# Patient Record
Sex: MALE | Race: BLACK OR AFRICAN AMERICAN | NOT HISPANIC OR LATINO | Employment: FULL TIME | URBAN - METROPOLITAN AREA
[De-identification: names, ages, dates, MRNs, and addresses within clinical notes are randomized per-mention and may not be internally consistent; named-entity substitution may affect disease eponyms.]

---

## 2017-04-16 ENCOUNTER — COMMUNICATION - HEALTHEAST (OUTPATIENT)
Dept: INTERNAL MEDICINE | Facility: CLINIC | Age: 57
End: 2017-04-16

## 2017-04-16 DIAGNOSIS — E11.9 TYPE 2 DIABETES MELLITUS (H): ICD-10-CM

## 2017-04-16 DIAGNOSIS — Z00.00 PREVENTATIVE HEALTH CARE: ICD-10-CM

## 2017-04-20 ENCOUNTER — OFFICE VISIT - HEALTHEAST (OUTPATIENT)
Dept: INTERNAL MEDICINE | Facility: CLINIC | Age: 57
End: 2017-04-20

## 2017-04-20 DIAGNOSIS — E11.9 TYPE 2 DIABETES MELLITUS (H): ICD-10-CM

## 2017-04-20 LAB
ALT SERPL W P-5'-P-CCNC: 19 U/L (ref 0–45)
LDLC SERPL CALC-MCNC: 166 MG/DL

## 2017-04-21 ENCOUNTER — COMMUNICATION - HEALTHEAST (OUTPATIENT)
Dept: INTERNAL MEDICINE | Facility: CLINIC | Age: 57
End: 2017-04-21

## 2017-04-21 DIAGNOSIS — E11.9 TYPE 2 DIABETES MELLITUS (H): ICD-10-CM

## 2017-04-24 ENCOUNTER — COMMUNICATION - HEALTHEAST (OUTPATIENT)
Dept: INTERNAL MEDICINE | Facility: CLINIC | Age: 57
End: 2017-04-24

## 2017-04-24 ENCOUNTER — AMBULATORY - HEALTHEAST (OUTPATIENT)
Dept: INTERNAL MEDICINE | Facility: CLINIC | Age: 57
End: 2017-04-24

## 2017-04-25 ENCOUNTER — COMMUNICATION - HEALTHEAST (OUTPATIENT)
Dept: INTERNAL MEDICINE | Facility: CLINIC | Age: 57
End: 2017-04-25

## 2017-04-25 DIAGNOSIS — E11.9 TYPE 2 DIABETES MELLITUS (H): ICD-10-CM

## 2017-05-05 ENCOUNTER — RECORDS - HEALTHEAST (OUTPATIENT)
Dept: ADMINISTRATIVE | Facility: OTHER | Age: 57
End: 2017-05-05

## 2017-06-23 ENCOUNTER — COMMUNICATION - HEALTHEAST (OUTPATIENT)
Dept: INTERNAL MEDICINE | Facility: CLINIC | Age: 57
End: 2017-06-23

## 2017-06-23 DIAGNOSIS — E11.9 TYPE 2 DIABETES MELLITUS (H): ICD-10-CM

## 2017-07-16 ENCOUNTER — COMMUNICATION - HEALTHEAST (OUTPATIENT)
Dept: INTERNAL MEDICINE | Facility: CLINIC | Age: 57
End: 2017-07-16

## 2017-07-16 DIAGNOSIS — Z00.00 PREVENTATIVE HEALTH CARE: ICD-10-CM

## 2017-12-11 ENCOUNTER — COMMUNICATION - HEALTHEAST (OUTPATIENT)
Dept: INTERNAL MEDICINE | Facility: CLINIC | Age: 57
End: 2017-12-11

## 2017-12-11 DIAGNOSIS — E11.9 TYPE 2 DIABETES MELLITUS (H): ICD-10-CM

## 2017-12-19 ENCOUNTER — OFFICE VISIT - HEALTHEAST (OUTPATIENT)
Dept: INTERNAL MEDICINE | Facility: CLINIC | Age: 57
End: 2017-12-19

## 2017-12-19 DIAGNOSIS — E11.9 TYPE 2 DIABETES MELLITUS (H): ICD-10-CM

## 2017-12-19 LAB
HBA1C MFR BLD: 6.6 % (ref 3.5–6)
LDLC SERPL CALC-MCNC: 108 MG/DL

## 2017-12-19 ASSESSMENT — MIFFLIN-ST. JEOR: SCORE: 1792.41

## 2017-12-20 ENCOUNTER — COMMUNICATION - HEALTHEAST (OUTPATIENT)
Dept: INTERNAL MEDICINE | Facility: CLINIC | Age: 57
End: 2017-12-20

## 2018-01-28 ENCOUNTER — COMMUNICATION - HEALTHEAST (OUTPATIENT)
Dept: INTERNAL MEDICINE | Facility: CLINIC | Age: 58
End: 2018-01-28

## 2018-01-28 ENCOUNTER — AMBULATORY - HEALTHEAST (OUTPATIENT)
Dept: INTERNAL MEDICINE | Facility: CLINIC | Age: 58
End: 2018-01-28

## 2018-06-13 ENCOUNTER — OFFICE VISIT - HEALTHEAST (OUTPATIENT)
Dept: INTERNAL MEDICINE | Facility: CLINIC | Age: 58
End: 2018-06-13

## 2018-06-13 ENCOUNTER — COMMUNICATION - HEALTHEAST (OUTPATIENT)
Dept: INTERNAL MEDICINE | Facility: CLINIC | Age: 58
End: 2018-06-13

## 2018-06-13 DIAGNOSIS — E11.9 TYPE 2 DIABETES MELLITUS (H): ICD-10-CM

## 2018-06-13 DIAGNOSIS — E11.9 TYPE 2 DIABETES MELLITUS WITHOUT COMPLICATION, WITHOUT LONG-TERM CURRENT USE OF INSULIN (H): ICD-10-CM

## 2018-06-13 DIAGNOSIS — Z00.00 PREVENTATIVE HEALTH CARE: ICD-10-CM

## 2018-06-13 DIAGNOSIS — Z00.00 HEALTH MAINTENANCE EXAMINATION: ICD-10-CM

## 2018-06-13 DIAGNOSIS — Z12.5 PROSTATE CANCER SCREENING: ICD-10-CM

## 2018-06-13 LAB
ALBUMIN SERPL-MCNC: 3.9 G/DL (ref 3.5–5)
ALP SERPL-CCNC: 47 U/L (ref 45–120)
ALT SERPL W P-5'-P-CCNC: 22 U/L (ref 0–45)
ANION GAP SERPL CALCULATED.3IONS-SCNC: 16 MMOL/L (ref 5–18)
AST SERPL W P-5'-P-CCNC: 26 U/L (ref 0–40)
BILIRUB DIRECT SERPL-MCNC: 0.1 MG/DL
BILIRUB SERPL-MCNC: 0.5 MG/DL (ref 0–1)
BUN SERPL-MCNC: 5 MG/DL (ref 8–22)
CALCIUM SERPL-MCNC: 9.7 MG/DL (ref 8.5–10.5)
CHLORIDE BLD-SCNC: 102 MMOL/L (ref 98–107)
CHOLEST SERPL-MCNC: 145 MG/DL
CO2 SERPL-SCNC: 22 MMOL/L (ref 22–31)
CREAT SERPL-MCNC: 0.73 MG/DL (ref 0.7–1.3)
CREAT UR-MCNC: 86.3 MG/DL
ERYTHROCYTE [DISTWIDTH] IN BLOOD BY AUTOMATED COUNT: 11.6 % (ref 11–14.5)
FASTING STATUS PATIENT QL REPORTED: NO
GFR SERPL CREATININE-BSD FRML MDRD: >60 ML/MIN/1.73M2
GLUCOSE BLD-MCNC: 123 MG/DL (ref 70–125)
HBA1C MFR BLD: 6.3 % (ref 3.5–6)
HCT VFR BLD AUTO: 43.6 % (ref 40–54)
HDLC SERPL-MCNC: 47 MG/DL
HGB BLD-MCNC: 14.6 G/DL (ref 14–18)
LDLC SERPL CALC-MCNC: 57 MG/DL
MCH RBC QN AUTO: 28.4 PG (ref 27–34)
MCHC RBC AUTO-ENTMCNC: 33.5 G/DL (ref 32–36)
MCV RBC AUTO: 85 FL (ref 80–100)
MICROALBUMIN UR-MCNC: <0.5 MG/DL (ref 0–1.99)
MICROALBUMIN/CREAT UR: NORMAL MG/G
PLATELET # BLD AUTO: 156 THOU/UL (ref 140–440)
PMV BLD AUTO: 8.7 FL (ref 7–10)
POTASSIUM BLD-SCNC: 4.2 MMOL/L (ref 3.5–5)
PROT SERPL-MCNC: 6.7 G/DL (ref 6–8)
PSA SERPL-MCNC: 1.1 NG/ML (ref 0–3.5)
RBC # BLD AUTO: 5.15 MILL/UL (ref 4.4–6.2)
SODIUM SERPL-SCNC: 140 MMOL/L (ref 136–145)
TRIGL SERPL-MCNC: 206 MG/DL
WBC: 7.2 THOU/UL (ref 4–11)

## 2018-06-13 ASSESSMENT — MIFFLIN-ST. JEOR: SCORE: 1819.63

## 2018-06-18 ENCOUNTER — COMMUNICATION - HEALTHEAST (OUTPATIENT)
Dept: INTERNAL MEDICINE | Facility: CLINIC | Age: 58
End: 2018-06-18

## 2018-06-18 DIAGNOSIS — E11.9 DIABETES MELLITUS, TYPE 2 (H): ICD-10-CM

## 2018-06-19 ENCOUNTER — COMMUNICATION - HEALTHEAST (OUTPATIENT)
Dept: INTERNAL MEDICINE | Facility: CLINIC | Age: 58
End: 2018-06-19

## 2018-12-14 ENCOUNTER — OFFICE VISIT - HEALTHEAST (OUTPATIENT)
Dept: INTERNAL MEDICINE | Facility: CLINIC | Age: 58
End: 2018-12-14

## 2018-12-14 ENCOUNTER — COMMUNICATION - HEALTHEAST (OUTPATIENT)
Dept: INTERNAL MEDICINE | Facility: CLINIC | Age: 58
End: 2018-12-14

## 2018-12-14 DIAGNOSIS — E11.9 TYPE 2 DIABETES MELLITUS (H): ICD-10-CM

## 2018-12-14 LAB
HBA1C MFR BLD: 6.5 % (ref 3.5–6)
LDLC SERPL CALC-MCNC: 73 MG/DL

## 2018-12-14 ASSESSMENT — MIFFLIN-ST. JEOR: SCORE: 1783.79

## 2018-12-15 ENCOUNTER — COMMUNICATION - HEALTHEAST (OUTPATIENT)
Dept: INTERNAL MEDICINE | Facility: CLINIC | Age: 58
End: 2018-12-15

## 2018-12-17 ENCOUNTER — COMMUNICATION - HEALTHEAST (OUTPATIENT)
Dept: INTERNAL MEDICINE | Facility: CLINIC | Age: 58
End: 2018-12-17

## 2018-12-17 DIAGNOSIS — E11.9 TYPE 2 DIABETES MELLITUS (H): ICD-10-CM

## 2019-01-16 ENCOUNTER — OFFICE VISIT - HEALTHEAST (OUTPATIENT)
Dept: INTERNAL MEDICINE | Facility: CLINIC | Age: 59
End: 2019-01-16

## 2019-01-16 DIAGNOSIS — M54.50 ACUTE MIDLINE LOW BACK PAIN WITHOUT SCIATICA: ICD-10-CM

## 2019-01-16 DIAGNOSIS — M54.2 CERVICALGIA: ICD-10-CM

## 2019-01-16 DIAGNOSIS — S06.0X9D CONCUSSION WITH LOSS OF CONSCIOUSNESS, SUBSEQUENT ENCOUNTER: ICD-10-CM

## 2019-01-16 ASSESSMENT — MIFFLIN-ST. JEOR: SCORE: 1788.78

## 2019-01-17 ENCOUNTER — COMMUNICATION - HEALTHEAST (OUTPATIENT)
Dept: INTERNAL MEDICINE | Facility: CLINIC | Age: 59
End: 2019-01-17

## 2019-01-17 ENCOUNTER — HOSPITAL ENCOUNTER (OUTPATIENT)
Dept: CT IMAGING | Facility: CLINIC | Age: 59
Discharge: HOME | End: 2019-01-17
Attending: INTERNAL MEDICINE

## 2019-01-17 DIAGNOSIS — S06.0X9D CONCUSSION WITH LOSS OF CONSCIOUSNESS, SUBSEQUENT ENCOUNTER: ICD-10-CM

## 2019-02-08 ENCOUNTER — COMMUNICATION - HEALTHEAST (OUTPATIENT)
Dept: INTERNAL MEDICINE | Facility: CLINIC | Age: 59
End: 2019-02-08

## 2019-05-28 ENCOUNTER — COMMUNICATION - HEALTHEAST (OUTPATIENT)
Dept: INTERNAL MEDICINE | Facility: CLINIC | Age: 59
End: 2019-05-28

## 2019-06-17 ENCOUNTER — COMMUNICATION - HEALTHEAST (OUTPATIENT)
Dept: INTERNAL MEDICINE | Facility: CLINIC | Age: 59
End: 2019-06-17

## 2019-06-17 DIAGNOSIS — Z12.5 PROSTATE CANCER SCREENING: ICD-10-CM

## 2019-06-17 DIAGNOSIS — E11.9 TYPE 2 DIABETES MELLITUS (H): ICD-10-CM

## 2019-06-24 ENCOUNTER — OFFICE VISIT - HEALTHEAST (OUTPATIENT)
Dept: INTERNAL MEDICINE | Facility: CLINIC | Age: 59
End: 2019-06-24

## 2019-06-24 DIAGNOSIS — Z12.11 SCREEN FOR COLON CANCER: ICD-10-CM

## 2019-06-24 DIAGNOSIS — E11.9 TYPE 2 DIABETES MELLITUS (H): ICD-10-CM

## 2019-06-24 DIAGNOSIS — Z12.5 PROSTATE CANCER SCREENING: ICD-10-CM

## 2019-06-24 LAB
ALBUMIN SERPL-MCNC: 3.9 G/DL (ref 3.5–5)
ALP SERPL-CCNC: 44 U/L (ref 45–120)
ALT SERPL W P-5'-P-CCNC: 22 U/L (ref 0–45)
ANION GAP SERPL CALCULATED.3IONS-SCNC: 12 MMOL/L (ref 5–18)
AST SERPL W P-5'-P-CCNC: 23 U/L (ref 0–40)
BILIRUB SERPL-MCNC: 0.4 MG/DL (ref 0–1)
BUN SERPL-MCNC: 6 MG/DL (ref 8–22)
CALCIUM SERPL-MCNC: 9.8 MG/DL (ref 8.5–10.5)
CHLORIDE BLD-SCNC: 103 MMOL/L (ref 98–107)
CHOLEST SERPL-MCNC: 172 MG/DL
CO2 SERPL-SCNC: 23 MMOL/L (ref 22–31)
CREAT SERPL-MCNC: 0.78 MG/DL (ref 0.7–1.3)
CREAT UR-MCNC: 39.2 MG/DL
FASTING STATUS PATIENT QL REPORTED: ABNORMAL
GFR SERPL CREATININE-BSD FRML MDRD: >60 ML/MIN/1.73M2
GLUCOSE BLD-MCNC: 99 MG/DL (ref 70–125)
HBA1C MFR BLD: 6.2 % (ref 3.5–6)
HDLC SERPL-MCNC: 48 MG/DL
LDLC SERPL CALC-MCNC: 88 MG/DL
MICROALBUMIN UR-MCNC: <0.5 MG/DL (ref 0–1.99)
MICROALBUMIN/CREAT UR: NORMAL MG/G
POTASSIUM BLD-SCNC: 4.3 MMOL/L (ref 3.5–5)
PROT SERPL-MCNC: 6.7 G/DL (ref 6–8)
PSA SERPL-MCNC: 0.9 NG/ML (ref 0–3.5)
SODIUM SERPL-SCNC: 138 MMOL/L (ref 136–145)
TRIGL SERPL-MCNC: 180 MG/DL

## 2019-06-24 ASSESSMENT — MIFFLIN-ST. JEOR: SCORE: 1807.38

## 2019-06-25 ENCOUNTER — COMMUNICATION - HEALTHEAST (OUTPATIENT)
Dept: INTERNAL MEDICINE | Facility: CLINIC | Age: 59
End: 2019-06-25

## 2019-07-23 ENCOUNTER — RECORDS - HEALTHEAST (OUTPATIENT)
Dept: ADMINISTRATIVE | Facility: OTHER | Age: 59
End: 2019-07-23

## 2019-07-23 LAB — RETINOPATHY: NEGATIVE

## 2019-07-30 ENCOUNTER — RECORDS - HEALTHEAST (OUTPATIENT)
Dept: HEALTH INFORMATION MANAGEMENT | Facility: CLINIC | Age: 59
End: 2019-07-30

## 2019-10-15 ENCOUNTER — COMMUNICATION - HEALTHEAST (OUTPATIENT)
Dept: INTERNAL MEDICINE | Facility: CLINIC | Age: 59
End: 2019-10-15

## 2019-10-15 ENCOUNTER — OFFICE VISIT - HEALTHEAST (OUTPATIENT)
Dept: INTERNAL MEDICINE | Facility: CLINIC | Age: 59
End: 2019-10-15

## 2019-10-15 DIAGNOSIS — E11.9 TYPE 2 DIABETES MELLITUS (H): ICD-10-CM

## 2019-10-15 LAB — HBA1C MFR BLD: 6.3 % (ref 3.5–6)

## 2020-08-19 ENCOUNTER — COMMUNICATION - HEALTHEAST (OUTPATIENT)
Dept: INTERNAL MEDICINE | Facility: CLINIC | Age: 60
End: 2020-08-19

## 2020-08-19 ENCOUNTER — OFFICE VISIT - HEALTHEAST (OUTPATIENT)
Dept: INTERNAL MEDICINE | Facility: CLINIC | Age: 60
End: 2020-08-19

## 2020-08-19 ENCOUNTER — COMMUNICATION - HEALTHEAST (OUTPATIENT)
Dept: NURSING | Facility: CLINIC | Age: 60
End: 2020-08-19

## 2020-08-19 DIAGNOSIS — E11.9 TYPE 2 DIABETES MELLITUS (H): ICD-10-CM

## 2020-08-19 DIAGNOSIS — E78.00 HYPERCHOLESTEROLEMIA: ICD-10-CM

## 2020-08-19 DIAGNOSIS — E11.9 TYPE 2 DIABETES MELLITUS WITHOUT COMPLICATION, WITHOUT LONG-TERM CURRENT USE OF INSULIN (H): ICD-10-CM

## 2020-08-19 LAB
ALBUMIN SERPL-MCNC: 3.9 G/DL (ref 3.5–5)
ALP SERPL-CCNC: 41 U/L (ref 45–120)
ALT SERPL W P-5'-P-CCNC: 13 U/L (ref 0–45)
ANION GAP SERPL CALCULATED.3IONS-SCNC: 12 MMOL/L (ref 5–18)
AST SERPL W P-5'-P-CCNC: 20 U/L (ref 0–40)
BILIRUB SERPL-MCNC: 0.4 MG/DL (ref 0–1)
BUN SERPL-MCNC: 3 MG/DL (ref 8–22)
CALCIUM SERPL-MCNC: 9 MG/DL (ref 8.5–10.5)
CHLORIDE BLD-SCNC: 101 MMOL/L (ref 98–107)
CHOLEST SERPL-MCNC: 168 MG/DL
CO2 SERPL-SCNC: 22 MMOL/L (ref 22–31)
CREAT SERPL-MCNC: 0.76 MG/DL (ref 0.7–1.3)
CREAT UR-MCNC: 51.1 MG/DL
FASTING STATUS PATIENT QL REPORTED: YES
GFR SERPL CREATININE-BSD FRML MDRD: >60 ML/MIN/1.73M2
GLUCOSE BLD-MCNC: 106 MG/DL (ref 70–125)
HBA1C MFR BLD: 6.1 %
HDLC SERPL-MCNC: 46 MG/DL
LDLC SERPL CALC-MCNC: 106 MG/DL
MICROALBUMIN UR-MCNC: <0.5 MG/DL (ref 0–1.99)
MICROALBUMIN/CREAT UR: NORMAL MG/G{CREAT}
POTASSIUM BLD-SCNC: 4.1 MMOL/L (ref 3.5–5)
PROT SERPL-MCNC: 7.2 G/DL (ref 6–8)
SODIUM SERPL-SCNC: 135 MMOL/L (ref 136–145)
TRIGL SERPL-MCNC: 80 MG/DL

## 2020-08-19 ASSESSMENT — MIFFLIN-ST. JEOR: SCORE: 1760.66

## 2020-08-20 ENCOUNTER — COMMUNICATION - HEALTHEAST (OUTPATIENT)
Dept: INTERNAL MEDICINE | Facility: CLINIC | Age: 60
End: 2020-08-20

## 2020-09-27 ENCOUNTER — COMMUNICATION - HEALTHEAST (OUTPATIENT)
Dept: INTERNAL MEDICINE | Facility: CLINIC | Age: 60
End: 2020-09-27

## 2020-09-28 ENCOUNTER — COMMUNICATION - HEALTHEAST (OUTPATIENT)
Dept: INTERNAL MEDICINE | Facility: CLINIC | Age: 60
End: 2020-09-28

## 2020-09-28 ENCOUNTER — OFFICE VISIT - HEALTHEAST (OUTPATIENT)
Dept: INTERNAL MEDICINE | Facility: CLINIC | Age: 60
End: 2020-09-28

## 2020-09-28 DIAGNOSIS — E11.9 TYPE 2 DIABETES MELLITUS WITHOUT COMPLICATION, WITHOUT LONG-TERM CURRENT USE OF INSULIN (H): ICD-10-CM

## 2020-09-28 DIAGNOSIS — I10 ESSENTIAL HYPERTENSION: ICD-10-CM

## 2020-09-28 DIAGNOSIS — E11.9 TYPE 2 DIABETES MELLITUS (H): ICD-10-CM

## 2020-09-28 ASSESSMENT — MIFFLIN-ST. JEOR: SCORE: 1753.86

## 2020-10-01 RX ORDER — SIMVASTATIN 20 MG
TABLET ORAL
Qty: 90 TABLET | Refills: 3 | Status: SHIPPED | OUTPATIENT
Start: 2020-10-01 | End: 2021-10-14

## 2020-10-08 ENCOUNTER — COMMUNICATION - HEALTHEAST (OUTPATIENT)
Dept: INTERNAL MEDICINE | Facility: CLINIC | Age: 60
End: 2020-10-08

## 2020-11-14 ENCOUNTER — COMMUNICATION - HEALTHEAST (OUTPATIENT)
Dept: INTERNAL MEDICINE | Facility: CLINIC | Age: 60
End: 2020-11-14

## 2020-11-14 DIAGNOSIS — E11.9 TYPE 2 DIABETES MELLITUS (H): ICD-10-CM

## 2020-12-19 ENCOUNTER — COMMUNICATION - HEALTHEAST (OUTPATIENT)
Dept: INTERNAL MEDICINE | Facility: CLINIC | Age: 60
End: 2020-12-19

## 2020-12-19 DIAGNOSIS — E11.9 TYPE 2 DIABETES MELLITUS (H): ICD-10-CM

## 2021-01-13 ENCOUNTER — COMMUNICATION - HEALTHEAST (OUTPATIENT)
Dept: INTERNAL MEDICINE | Facility: CLINIC | Age: 61
End: 2021-01-13

## 2021-01-13 DIAGNOSIS — I10 ESSENTIAL HYPERTENSION: ICD-10-CM

## 2021-01-21 ENCOUNTER — OFFICE VISIT - HEALTHEAST (OUTPATIENT)
Dept: INTERNAL MEDICINE | Facility: CLINIC | Age: 61
End: 2021-01-21

## 2021-01-21 DIAGNOSIS — Z12.11 COLON CANCER SCREENING: ICD-10-CM

## 2021-01-21 DIAGNOSIS — E78.00 HYPERCHOLESTEROLEMIA: ICD-10-CM

## 2021-01-21 DIAGNOSIS — E11.9 TYPE 2 DIABETES MELLITUS WITHOUT COMPLICATION, WITHOUT LONG-TERM CURRENT USE OF INSULIN (H): ICD-10-CM

## 2021-01-21 DIAGNOSIS — E55.9 VITAMIN D DEFICIENCY: ICD-10-CM

## 2021-01-21 DIAGNOSIS — Z11.59 ENCOUNTER FOR HEPATITIS C SCREENING TEST FOR LOW RISK PATIENT: ICD-10-CM

## 2021-01-21 DIAGNOSIS — I10 ESSENTIAL HYPERTENSION: ICD-10-CM

## 2021-01-21 DIAGNOSIS — Z12.5 SCREENING FOR PROSTATE CANCER: ICD-10-CM

## 2021-01-21 DIAGNOSIS — Z23 NEED FOR VACCINATION: ICD-10-CM

## 2021-01-21 DIAGNOSIS — Z00.00 ANNUAL PHYSICAL EXAM: ICD-10-CM

## 2021-01-21 LAB
ALBUMIN SERPL-MCNC: 4.2 G/DL (ref 3.5–5)
ALP SERPL-CCNC: 45 U/L (ref 45–120)
ALT SERPL W P-5'-P-CCNC: 18 U/L (ref 0–45)
ANION GAP SERPL CALCULATED.3IONS-SCNC: 12 MMOL/L (ref 5–18)
AST SERPL W P-5'-P-CCNC: 23 U/L (ref 0–40)
BASOPHILS # BLD AUTO: 0.1 THOU/UL (ref 0–0.2)
BASOPHILS NFR BLD AUTO: 1 % (ref 0–2)
BILIRUB SERPL-MCNC: 0.5 MG/DL (ref 0–1)
BUN SERPL-MCNC: 5 MG/DL (ref 8–22)
CALCIUM SERPL-MCNC: 9.2 MG/DL (ref 8.5–10.5)
CHLORIDE BLD-SCNC: 102 MMOL/L (ref 98–107)
CHOLEST SERPL-MCNC: 139 MG/DL
CO2 SERPL-SCNC: 23 MMOL/L (ref 22–31)
CREAT SERPL-MCNC: 0.83 MG/DL (ref 0.7–1.3)
CREAT UR-MCNC: 75.7 MG/DL
EOSINOPHIL # BLD AUTO: 0.2 THOU/UL (ref 0–0.4)
EOSINOPHIL NFR BLD AUTO: 2 % (ref 0–6)
ERYTHROCYTE [DISTWIDTH] IN BLOOD BY AUTOMATED COUNT: 11 % (ref 11–14.5)
FASTING STATUS PATIENT QL REPORTED: NORMAL
GFR SERPL CREATININE-BSD FRML MDRD: >60 ML/MIN/1.73M2
GLUCOSE BLD-MCNC: 106 MG/DL (ref 70–125)
HBA1C MFR BLD: 6.5 %
HCT VFR BLD AUTO: 43.6 % (ref 40–54)
HDLC SERPL-MCNC: 49 MG/DL
HGB BLD-MCNC: 14.9 G/DL (ref 14–18)
LDLC SERPL CALC-MCNC: 75 MG/DL
LYMPHOCYTES # BLD AUTO: 2.8 THOU/UL (ref 0.8–4.4)
LYMPHOCYTES NFR BLD AUTO: 34 % (ref 20–40)
MCH RBC QN AUTO: 28.4 PG (ref 27–34)
MCHC RBC AUTO-ENTMCNC: 34.1 G/DL (ref 32–36)
MCV RBC AUTO: 83 FL (ref 80–100)
MICROALBUMIN UR-MCNC: <0.5 MG/DL (ref 0–1.99)
MICROALBUMIN/CREAT UR: NORMAL MG/G{CREAT}
MONOCYTES # BLD AUTO: 0.4 THOU/UL (ref 0–0.9)
MONOCYTES NFR BLD AUTO: 5 % (ref 2–10)
NEUTROPHILS # BLD AUTO: 4.9 THOU/UL (ref 2–7.7)
NEUTROPHILS NFR BLD AUTO: 58 % (ref 50–70)
PLATELET # BLD AUTO: 164 THOU/UL (ref 140–440)
PMV BLD AUTO: 8.6 FL (ref 7–10)
POTASSIUM BLD-SCNC: 3.9 MMOL/L (ref 3.5–5)
PROT SERPL-MCNC: 7.1 G/DL (ref 6–8)
PSA SERPL-MCNC: 1.1 NG/ML (ref 0–3.5)
RBC # BLD AUTO: 5.23 MILL/UL (ref 4.4–6.2)
SODIUM SERPL-SCNC: 137 MMOL/L (ref 136–145)
TRIGL SERPL-MCNC: 77 MG/DL
VIT B12 SERPL-MCNC: 170 PG/ML (ref 213–816)
WBC: 8.3 THOU/UL (ref 4–11)

## 2021-01-21 ASSESSMENT — MIFFLIN-ST. JEOR: SCORE: 1776.54

## 2021-01-22 LAB
25(OH)D3 SERPL-MCNC: 10 NG/ML (ref 30–80)
HCV AB SERPL QL IA: NEGATIVE

## 2021-01-24 ENCOUNTER — COMMUNICATION - HEALTHEAST (OUTPATIENT)
Dept: INTERNAL MEDICINE | Facility: CLINIC | Age: 61
End: 2021-01-24

## 2021-01-24 DIAGNOSIS — E53.8 VITAMIN B12 DEFICIENCY (NON ANEMIC): ICD-10-CM

## 2021-01-24 DIAGNOSIS — E55.9 VITAMIN D DEFICIENCY: ICD-10-CM

## 2021-01-25 ENCOUNTER — COMMUNICATION - HEALTHEAST (OUTPATIENT)
Dept: INTERNAL MEDICINE | Facility: CLINIC | Age: 61
End: 2021-01-25

## 2021-01-25 DIAGNOSIS — E55.9 VITAMIN D DEFICIENCY: ICD-10-CM

## 2021-01-25 DIAGNOSIS — E53.8 VITAMIN B12 DEFICIENCY (NON ANEMIC): ICD-10-CM

## 2021-04-21 ENCOUNTER — OFFICE VISIT - HEALTHEAST (OUTPATIENT)
Dept: INTERNAL MEDICINE | Facility: CLINIC | Age: 61
End: 2021-04-21

## 2021-04-21 ENCOUNTER — COMMUNICATION - HEALTHEAST (OUTPATIENT)
Dept: INTERNAL MEDICINE | Facility: CLINIC | Age: 61
End: 2021-04-21

## 2021-04-21 DIAGNOSIS — E53.8 VITAMIN B12 DEFICIENCY (NON ANEMIC): ICD-10-CM

## 2021-04-21 DIAGNOSIS — E55.9 VITAMIN D DEFICIENCY: ICD-10-CM

## 2021-04-21 DIAGNOSIS — I10 ESSENTIAL HYPERTENSION: ICD-10-CM

## 2021-04-21 DIAGNOSIS — E11.9 TYPE 2 DIABETES MELLITUS WITHOUT COMPLICATION, WITHOUT LONG-TERM CURRENT USE OF INSULIN (H): ICD-10-CM

## 2021-04-21 LAB
FOLATE SERPL-MCNC: 11.9 NG/ML
VIT B12 SERPL-MCNC: 351 PG/ML (ref 213–816)

## 2021-04-21 ASSESSMENT — MIFFLIN-ST. JEOR: SCORE: 1794.68

## 2021-04-23 ENCOUNTER — AMBULATORY - HEALTHEAST (OUTPATIENT)
Dept: NURSING | Facility: CLINIC | Age: 61
End: 2021-04-23

## 2021-04-27 ENCOUNTER — COMMUNICATION - HEALTHEAST (OUTPATIENT)
Dept: INTERNAL MEDICINE | Facility: CLINIC | Age: 61
End: 2021-04-27

## 2021-04-27 DIAGNOSIS — E53.8 VITAMIN B12 DEFICIENCY (NON ANEMIC): ICD-10-CM

## 2021-04-27 DIAGNOSIS — E11.9 TYPE 2 DIABETES MELLITUS WITHOUT COMPLICATION, WITHOUT LONG-TERM CURRENT USE OF INSULIN (H): ICD-10-CM

## 2021-04-27 DIAGNOSIS — E11.9 TYPE 2 DIABETES MELLITUS (H): ICD-10-CM

## 2021-04-27 DIAGNOSIS — E55.9 VITAMIN D DEFICIENCY: ICD-10-CM

## 2021-04-27 DIAGNOSIS — I10 ESSENTIAL HYPERTENSION: ICD-10-CM

## 2021-04-28 ENCOUNTER — COMMUNICATION - HEALTHEAST (OUTPATIENT)
Dept: INTERNAL MEDICINE | Facility: CLINIC | Age: 61
End: 2021-04-28

## 2021-04-28 RX ORDER — LOSARTAN POTASSIUM 50 MG/1
50 TABLET ORAL DAILY
Qty: 90 TABLET | Refills: 3 | Status: SHIPPED | OUTPATIENT
Start: 2021-04-28 | End: 2022-04-28

## 2021-05-05 ENCOUNTER — COMMUNICATION - HEALTHEAST (OUTPATIENT)
Dept: INTERNAL MEDICINE | Facility: CLINIC | Age: 61
End: 2021-05-05

## 2021-05-05 DIAGNOSIS — E11.9 TYPE 2 DIABETES MELLITUS (H): ICD-10-CM

## 2021-05-11 ENCOUNTER — AMBULATORY - HEALTHEAST (OUTPATIENT)
Dept: NURSING | Facility: CLINIC | Age: 61
End: 2021-05-11

## 2021-05-29 NOTE — TELEPHONE ENCOUNTER
Spoke with pt about diabetic eye quality.  Pt reports he didn't have health insurance last year, so he didn't go to the eye doctor.  Pt reports he has health insurance now, so he will make an appt to see the ophthalmologist next month.  Pt declined referral.

## 2021-05-29 NOTE — PROGRESS NOTES
[facility] Clinic Note    Tano Garcia   58 y.o. male    Date of Visit: 6/24/2019  Chief Complaint   Patient presents with     Diabetes       ASSESSMENT/PLAN  1. Type 2 diabetes mellitus (H)  Microalbumin, Random Urine    Glycosylated Hemoglobin A1c    Lipid Cascade    Comprehensive Metabolic Panel    Ambulatory referral to Ophthalmology   2. Screen for colon cancer  Ambulatory referral for Colonoscopy   3. Prostate cancer screening  PSA (Prostatic-Specific Antigen), Annual Screen     ---------------------------------------------    1.  Well-controlled, A1c at goal    2.  Referred for colonoscopy    3.  Update PSA.    Return in about 6 months (around 12/24/2019) for Recheck, Diabetes.      SUBJECTIVE    Tano Garcia is here for diabetes check.  He had an A1c pre-draw, but 40 minutes after the A1c is not yet available.  He agreed to receive results via mail.    He has not had any concerns with the diabetes per se.    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  He otherwise passed all the tests up to that point.  He would like to try again next year.    He is still doing skilled nursing work at present.      ROS:   Per HPI, all other systems negative     Medications, allergies, and problem list were reviewed and updated    Patient Active Problem List   Diagnosis     Nephrolithiasis     Osteoarthritis     Type 2 diabetes mellitus (H)     Hyperlipidemia     Prostate cancer screening     No past medical history on file.  Current Outpatient Medications   Medication Sig Dispense Refill     aspirin 81 MG EC tablet Take 1 tablet (81 mg total) by mouth daily. 90 tablet 3     blood glucose meter (GLUCOMETER) Use 1 each As Directed as needed. Dispense glucometer brand per patient's insurance at pharmacy discretion. (E11.9) 1 each 0     generic lancets Use 1 each As Directed 2 (two) times a  day. Dispense brand per patient's insurance at pharmacy discretion. (E11.9) 200 each 1     loratadine (CLARITIN) 10 mg tablet Take 1 tablet (10 mg total) by mouth daily. 30 tablet 3     metFORMIN (GLUCOPHAGE) 1000 MG tablet TAKE 1 TAB BY MOUTH TWICE DAILY. 180 tablet 3     ONETOUCH DELICA LANCETS 30 gauge Misc USE ONCE DAILY 100 each 3     ONETOUCH ULTRA BLUE TEST STRIP strips USE 1 STRIP AS DIRECTED 2 TIMES A  strip 3     simvastatin (ZOCOR) 20 MG tablet Take 1 tablet (20 mg total) by mouth at bedtime. 90 tablet 3     No current facility-administered medications for this visit.      No Known Allergies    EXAM  Vitals:    06/24/19 1608   BP: 108/62   Patient Site: Left Arm   Patient Position: Sitting   Cuff Size: Adult Regular   Pulse: (!) 55   SpO2: 98%   Weight: 209 lb 4.8 oz (94.9 kg)   Height: 6' (1.829 m)         Skin: No ulcers on feet  Cardiovascular: 2+ pedal pulses bilaterally  Neuro: 6/6 on R and 5/6 on L for monofilament     Results reviewed:       Recent Results (from the past 240 hour(s))   Microalbumin, Random Urine   Result Value Ref Range    Microalbumin, Random Urine <0.50 0.00 - 1.99 mg/dL    Creatinine, Urine 39.2 mg/dL    Microalbumin/Creatinine Ratio Random Urine  <=19.9 mg/g   Glycosylated Hemoglobin A1c   Result Value Ref Range    Hemoglobin A1c 6.2 (H) 3.5 - 6.0 %   Lipid Cascade   Result Value Ref Range    Cholesterol 172 <=199 mg/dL    Triglycerides 180 (H) <=149 mg/dL    HDL Cholesterol 48 >=40 mg/dL    LDL Calculated 88 <=129 mg/dL    Patient Fasting > 8hrs? Unknown    Comprehensive Metabolic Panel   Result Value Ref Range    Sodium 138 136 - 145 mmol/L    Potassium 4.3 3.5 - 5.0 mmol/L    Chloride 103 98 - 107 mmol/L    CO2 23 22 - 31 mmol/L    Anion Gap, Calculation 12 5 - 18 mmol/L    Glucose 99 70 - 125 mg/dL    BUN 6 (L) 8 - 22 mg/dL    Creatinine 0.78 0.70 - 1.30 mg/dL    GFR MDRD Af Amer >60 >60 mL/min/1.73m2    GFR MDRD Non Af Amer >60 >60 mL/min/1.73m2    Bilirubin, Total  0.4 0.0 - 1.0 mg/dL    Calcium 9.8 8.5 - 10.5 mg/dL    Protein, Total 6.7 6.0 - 8.0 g/dL    Albumin 3.9 3.5 - 5.0 g/dL    Alkaline Phosphatase 44 (L) 45 - 120 U/L    AST 23 0 - 40 U/L    ALT 22 0 - 45 U/L   PSA (Prostatic-Specific Antigen), Annual Screen   Result Value Ref Range    PSA 0.9 0.0 - 3.5 ng/mL      Carlos Pete DO  Internal Medicine  [facility]

## 2021-05-29 NOTE — TELEPHONE ENCOUNTER
Please call pt to schedule DM & Microalbumin follow up with PCP     Please schedule pt for lab only before appointment with PCP if okay with the pt

## 2021-05-30 VITALS — HEIGHT: 72 IN | BODY MASS INDEX: 29.16 KG/M2

## 2021-05-31 VITALS — HEIGHT: 72 IN | BODY MASS INDEX: 27.9 KG/M2 | WEIGHT: 206 LBS

## 2021-06-01 VITALS — HEIGHT: 72 IN | WEIGHT: 212 LBS | BODY MASS INDEX: 28.71 KG/M2

## 2021-06-02 VITALS — BODY MASS INDEX: 27.64 KG/M2 | HEIGHT: 72 IN | WEIGHT: 204.1 LBS

## 2021-06-02 VITALS — WEIGHT: 205.2 LBS | BODY MASS INDEX: 27.79 KG/M2 | HEIGHT: 72 IN

## 2021-06-02 NOTE — PROGRESS NOTES
[facility] Clinic Note    Tano Garcia   59 y.o. male    Date of Visit: 10/15/2019  Chief Complaint   Patient presents with     Diabetes     Diabetic check, no concerns        ASSESSMENT/PLAN  1. Type 2 diabetes mellitus (H)  Glycosylated Hemoglobin A1c    blood glucose test (ONETOUCH ULTRA BLUE TEST STRIP) strips    metFORMIN (GLUCOPHAGE) 1000 MG tablet    aspirin 81 MG EC tablet     ---------------------------------------------    Diabetes well controlled, refill medications, test strips, update A1c.    Return in about 6 months (around 4/15/2020) for Diabetes, Recheck.      AB Freedman is a 59-year-old man with history of type 2 diabetes which is well controlled, who presents for medication check.    I last saw him on June 24, at which time he had an A1c drawn which was 6.2%.  Urine microalbumin screen was negative, and cholesterol well controlled.  PSA was also drawn and was 0.9.    The BG are in the 120s.     He is dealing with some new stress, family coming from [place].  He is originally from [place].  He is now looking for a bigger apartment.    He won the lottery to come to the [place] over 10 years ago, when his kids were 3 and 6, now they are 13 and 16 years old, they have been trying to come to the [place], but the border for [place] has been closed a long time, only recently did a get to cross to [place].  Tano cannot visit them, because he cannot leave the country.  He is hoping that everything goes well and his children can come to [place].  He has kept a lot of their report cards, things have sent him, he talks with them about once a week.    He is doing prison work, looking elsewhere    ROS:   Per HPI, all other systems negative     Medications, allergies, and problem list were reviewed and updated    Patient Active Problem List   Diagnosis     Nephrolithiasis     Osteoarthritis     Type 2 diabetes mellitus (H)     Hyperlipidemia     Prostate cancer  screening     History reviewed. No pertinent past medical history.  Current Outpatient Medications   Medication Sig Dispense Refill     aspirin 81 MG EC tablet Take 1 tablet (81 mg total) by mouth daily. 90 tablet 3     blood glucose meter (GLUCOMETER) Use 1 each As Directed as needed. Dispense glucometer brand per patient's insurance at pharmacy discretion. (E11.9) 1 each 0     blood glucose test (ONETOUCH ULTRA BLUE TEST STRIP) strips Apply 1 each topically daily. Dispense brand per patient's insurance at pharmacy discretion. 100 strip 3     generic lancets Use 1 each As Directed 2 (two) times a day. Dispense brand per patient's insurance at pharmacy discretion. (E11.9) 200 each 1     loratadine (CLARITIN) 10 mg tablet Take 1 tablet (10 mg total) by mouth daily. 30 tablet 3     metFORMIN (GLUCOPHAGE) 1000 MG tablet TAKE 1 TAB BY MOUTH TWICE DAILY 180 tablet 3     ONETOUCH DELICA LANCETS 30 gauge Misc USE ONCE DAILY 100 each 3     simvastatin (ZOCOR) 20 MG tablet Take 1 tablet (20 mg total) by mouth at bedtime. 90 tablet 3     No current facility-administered medications for this visit.      No Known Allergies    EXAM  Vitals:    10/15/19 1538   BP: 136/72   Patient Site: Left Arm   Patient Position: Sitting   Cuff Size: Adult Regular   Pulse: 60   Resp: 16   Temp: 97.6  F (36.4  C)   Weight: 209 lb 14.4 oz (95.2 kg)         General: Alert, no distress  Heart: Regular rate rhythm, no murmurs  Lungs: Clear to auscultation bilaterally    Results reviewed: Check A1c today  Data points:  1    Carlos Pete DO  Internal Medicine  [facility]

## 2021-06-03 VITALS
HEART RATE: 60 BPM | BODY MASS INDEX: 28.47 KG/M2 | RESPIRATION RATE: 16 BRPM | DIASTOLIC BLOOD PRESSURE: 72 MMHG | SYSTOLIC BLOOD PRESSURE: 136 MMHG | WEIGHT: 209.9 LBS | TEMPERATURE: 97.6 F

## 2021-06-03 VITALS — WEIGHT: 209.3 LBS | HEIGHT: 72 IN | BODY MASS INDEX: 28.35 KG/M2

## 2021-06-04 VITALS
OXYGEN SATURATION: 97 % | HEART RATE: 55 BPM | BODY MASS INDEX: 28.14 KG/M2 | SYSTOLIC BLOOD PRESSURE: 142 MMHG | DIASTOLIC BLOOD PRESSURE: 70 MMHG | WEIGHT: 201 LBS | HEIGHT: 71 IN

## 2021-06-04 VITALS
BODY MASS INDEX: 26.95 KG/M2 | HEART RATE: 55 BPM | SYSTOLIC BLOOD PRESSURE: 138 MMHG | DIASTOLIC BLOOD PRESSURE: 76 MMHG | HEIGHT: 72 IN | WEIGHT: 199 LBS | OXYGEN SATURATION: 98 %

## 2021-06-05 VITALS
WEIGHT: 206 LBS | HEIGHT: 71 IN | TEMPERATURE: 97.8 F | SYSTOLIC BLOOD PRESSURE: 130 MMHG | OXYGEN SATURATION: 98 % | HEART RATE: 58 BPM | DIASTOLIC BLOOD PRESSURE: 68 MMHG | BODY MASS INDEX: 28.84 KG/M2

## 2021-06-05 VITALS
HEIGHT: 71 IN | SYSTOLIC BLOOD PRESSURE: 138 MMHG | BODY MASS INDEX: 29.4 KG/M2 | DIASTOLIC BLOOD PRESSURE: 66 MMHG | HEART RATE: 52 BPM | OXYGEN SATURATION: 100 % | WEIGHT: 210 LBS

## 2021-06-10 NOTE — PROGRESS NOTES
Clinic Care Coordination Contact  [facility]/Voicemail       Clinical Data: Care Coordinator Outreach  Outreach attempted x 2.  Left message on patient's voicemail with call back information and requested return call.  Plan: Care Coordinator will send care coordination introduction letter with care coordinator contact information and explanation of care coordination services via mail. Care Coordinator will do no further outreaches at this time.

## 2021-06-10 NOTE — PROGRESS NOTES
ASSESSMENT:  1. Type 2 diabetes mellitus  Tano Garcia is a 56-year-old man here for diabetes check.  A1c traditionally has been well managed with diet, metformin.  Plan to recheck A1c today, refill test strips.  He is on aspirin and statin.  Foot exam is normal today.  He is overdue for eye exam, and was referred.  Since he is not fasting, check direct LDL.  - Basic Metabolic Panel  - Microalbumin, Random Urine  - LDL Cholesterol, Direct (increase simvastatin 10 to 20 since , ensure adherence)  - ALT (SGPT)  - Ambulatory referral to Ophthalmology        PLAN:  Patient Instructions   Recommend yearly eye exam     Blood tests today    Medications and test strips refilled       Orders Placed This Encounter   Procedures     Basic Metabolic Panel     Microalbumin, Random Urine     LDL Cholesterol, Direct     ALT (SGPT)     Ambulatory referral to Ophthalmology     Referral Priority:   Routine     Referral Type:   Consultation     Referral Reason:   Evaluation and Treatment     Referral Location:   [facility]     Requested Specialty:   Ophthalmology     Number of Visits Requested:   1     Medications Discontinued During This Encounter   Medication Reason     metFORMIN (GLUCOPHAGE) 1000 MG tablet Duplicate order     metFORMIN (GLUCOPHAGE) 1000 MG tablet      tadalafil (CIALIS) 10 MG tablet      simvastatin (ZOCOR) 10 MG tablet Reorder       Return in about 6 months (around 10/20/2017).    CHIEF COMPLAINT:  Chief Complaint   Patient presents with     Medication Management       HISTORY OF PRESENT ILLNESS:  Tano is a 56 y.o. male presenting to the clinic today for medication review.     Diabetes: He continues on metformin 1000mg twice daily. His most recent A1c was 7.1 on 10/20/16. He is due for diabetic eye exam.     Hyperlipidemia: He continues on simvastatin 10mg daily in addition to a low-dose aspirin.     REVIEW OF SYSTEMS:   He does not get regular exercise but is active at his job.  All other systems are negative.    PFSH:  He is currently working as a     TOBACCO USE:  History   Smoking Status     Former Smoker   Smokeless Tobacco     Not on file       VITALS:  Vitals:    04/20/17 1412   BP: 132/80   Pulse: (!) 56   Resp: 12   Height: 6' (1.829 m)     Wt Readings from Last 3 Encounters:   10/20/16 215 lb (97.5 kg)   05/18/16 208 lb (94.3 kg)   02/03/16 212 lb (96.2 kg)     There is no height or weight on file to calculate BMI.    PHYSICAL EXAM:  General: Alert and talkative. In no distress.   Heart: Regular rate and rhythm. No murmur.   Lungs: Clear to auscultation. Respiratory effort normal.   Diabetic foot exam: Bilateral palpable pedal pulses. Full sensation to monofilament bilaterally.     ADDITIONAL HISTORY SUMMARIZED (2): Reviewed notes of 10/20/16 and 5/18/16.  DECISION TO OBTAIN EXTRA INFORMATION (1): None.   RADIOLOGY TESTS (1): None.  LABS (1): Labs ordered. Past labs reviewed regarding A1c.   MEDICINE TESTS (1): None.  INDEPENDENT REVIEW (2 each): None.       The visit lasted a total of 11 minutes face to face with the patient. Over 50% of the time was spent counseling and educating the patient about dibates.    IRishi, am scribing for and in the presence of, Dr. Pete.    IDr. Pete, personally performed the services described in this documentation, as scribed by Rishi Barraza in my presence, and it is both accurate and complete.    MEDICATIONS:  Current Outpatient Prescriptions   Medication Sig Dispense Refill     ACCU-CHEK CINDI PLUS TEST STRP strips USE TO TEST BLOOD SUGAR TWICE DAILY 200 strip 1     aspirin 81 MG EC tablet TAKE 1 TAB BY MOUTH DAILY 90 tablet 0     loratadine (CLARITIN) 10 mg tablet Take 1 tablet (10 mg total) by mouth daily. 30 tablet 3     metFORMIN (GLUCOPHAGE) 1000 MG tablet TAKE 1 TAB BY MOUTH TWICE DAILY 180 tablet 0     simvastatin (ZOCOR) 10 MG tablet Take 1 tablet (10 mg total) by mouth at bedtime. 90 tablet 3     No current  facility-administered medications for this visit.        Total data points: 3

## 2021-06-10 NOTE — PROGRESS NOTES
Office Visit - Follow Up   Tano Garcia   60 y.o. male    Date of Visit: 8/19/2020    Chief Complaint   Patient presents with     Follow-up     diabetes         Assessment and Plan   1. Type 2 diabetes mellitus without complication, without long-term current use of insulin (H)  Labs today for monitoring.  I did discuss with him he should be taking a statin for primary prevention in addition to the aspirin for now.  He needs to establish care with new providers which we will try to facilitate.  He also is in need of a DOT physical apparently so we will try to help him set that up as well.  Finally he does not have insurance so I will have our care management team reach out to him.  - Ambulatory referral to Care Management (Primary Care)  - Glycosylated Hemoglobin A1c  - Comprehensive Metabolic Panel  - Lipid Cascade  - Microalbumin, Random Urine    2. Hypercholesterolemia  As above, I have urged patient to resume the simvastatin.  - Lipid Cascade        Return in about 4 weeks (around 9/16/2020) for Annual physical.     History of Present Illness   This 60 y.o. old patient of Dr. Ovalle's comes in today because he needs refills.  Diabetic.  Reports he has been doing well.  Currently without insurance.  He stopped taking his simvastatin because he thought it was the same thing is aspirin.  He needs a DOT physical and I do not provide those.  I am also not taking new patients and I discussed this with him today.  He would be interested in talking to someone about getting insurance.    Review of Systems: A comprehensive review of systems was negative except as noted.     Medications, Allergies and Problem List   Reviewed, reconciled and updated  Post Discharge Medication Reconciliation Status:      Physical Exam   General Appearance:   Pleasant gentleman in no distress.  Foot exam is normal with no open areas and normal monofilament testing.    /76 (Patient Site: Right Arm, Patient Position: Sitting,  Cuff Size: Adult Regular)   Pulse (!) 55   Ht 6' (1.829 m)   Wt 199 lb (90.3 kg)   SpO2 98%   BMI 26.99 kg/m           Additional Information   Current Outpatient Medications   Medication Sig Dispense Refill     aspirin 81 MG EC tablet Take 1 tablet (81 mg total) by mouth daily. 90 tablet 3     blood glucose meter (GLUCOMETER) Use 1 each As Directed as needed. Dispense glucometer brand per patient's insurance at pharmacy discretion. (E11.9) 1 each 0     blood glucose test (ONETOUCH ULTRA BLUE TEST STRIP) strips Apply 1 each topically daily. Dispense brand per patient's insurance at pharmacy discretion. 100 strip 3     generic lancets Use 1 each As Directed 2 (two) times a day. Dispense brand per patient's insurance at pharmacy discretion. (E11.9) 200 each 1     metFORMIN (GLUCOPHAGE) 1000 MG tablet TAKE 1 TAB BY MOUTH TWICE DAILY 180 tablet 3     ONETOUCH DELICA LANCETS 30 gauge Misc USE ONCE DAILY 100 each 3     simvastatin (ZOCOR) 20 MG tablet Take 1 tablet (20 mg total) by mouth at bedtime. 90 tablet 3     No current facility-administered medications for this visit.      No Known Allergies  Social History     Tobacco Use     Smoking status: Former Smoker     Smokeless tobacco: Never Used   Substance Use Topics     Alcohol use: No     Drug use: Not on file       Review and/or order of clinical lab tests:  Review and/or order of radiology tests:  Review and/or order of medicine tests:  Discussion of test results with performing physician:  Decision to obtain old records and/or obtain history from someone other than the patient:  Review and summarization of old records and/or obtaining history from someone other than the patient and.or discussion of case with another health care provider:  Independent visualization of image, tracing or specimen itself:    Time: total time spent with the patient was 15 minutes of which >50% was spent in counseling and coordination of care     Natanael Adams MD

## 2021-06-10 NOTE — PROGRESS NOTES
Clinic Care Coordination Contact  [facility]/Voicemail       Clinical Data: Care Coordinator Outreach  Outreach attempted x 1.  Left message on patient's voicemail with call back information and requested return call.  Plan  Care Coordinator will try to reach patient again in 1-2 business days.

## 2021-06-11 NOTE — TELEPHONE ENCOUNTER
This is pt of DR Phillip.  On My schedule for PE.    Was seen by Dr Adams in August for refills. Dr Adams  mentioned to establish care with new provider since he is not taking new pts and does not do DOT exam.    I do not take new pts either.    I'm not sure what DOT exam consists of. Would I need to be certified to perform it?  Please find out, pt does not have insurance and if I am not able to fill out his paper work, it would be a loss for him.

## 2021-06-11 NOTE — TELEPHONE ENCOUNTER
LMTCB.  Please relay below message to pt.  Dr. Flaherty does not do DOT Physicals and pt is unable to establish care with her as Dr. Flaherty's practice is closed.    Please assist in rescheduling pt to a provider who is taking on new patients (Dr. Phillips, Dr. Miranda).

## 2021-06-11 NOTE — PATIENT INSTRUCTIONS - HE
1. Get pneumonia shot and flu shot this year.    2. For high b/p start on Losartan 50 mg a day. Repeat ptassium level in 2 weeks. Check b/p at home and leave a message for me with readings.    good, to achieve stated therapy goals

## 2021-06-11 NOTE — TELEPHONE ENCOUNTER
Refill Approved    Rx renewed per Medication Renewal Policy. Medication was last renewed on 8/19/20.    Reina Loo, [facility] Triage/Med Refill 10/1/2020     Requested Prescriptions   Pending Prescriptions Disp Refills     simvastatin (ZOCOR) 20 MG tablet [Pharmacy Med Name: SIMVASTATIN 20 MG TABLET] 30 tablet 0     Sig: TAKE 1 TABLET BY MOUTH EVERY DAY       Statins Refill Protocol (Hmg CoA Reductase Inhibitors) Passed - 9/28/2020  7:31 AM        Passed - PCP or prescribing provider visit in past 12 months      Last office visit with prescriber/PCP: 8/19/2020 Natanael Adams MD OR same dept: 8/19/2020 Natanael Adams MD OR same specialty: 8/19/2020 Natanael Adams MD  Last physical: Visit date not found Last MTM visit: Visit date not found   Next visit within 3 mo: Visit date not found  Next physical within 3 mo: Visit date not found  Prescriber OR PCP: Natanael Adams MD  Last diagnosis associated with med order: 1. Type 2 diabetes mellitus (H)  - simvastatin (ZOCOR) 20 MG tablet [Pharmacy Med Name: SIMVASTATIN 20 MG TABLET]; TAKE 1 TABLET BY MOUTH EVERY DAY  Dispense: 30 tablet; Refill: 0    If protocol passes may refill for 12 months if within 3 months of last provider visit (or a total of 15 months).

## 2021-06-11 NOTE — PROGRESS NOTES
[facility] Follow Up Note    Assessment/Plan:    1. Essential hypertension  Due to his history of diabetes we will start him on losartan 50 mg a day.  I recommended that he gets blood pressure cuff and checks his blood pressure at home.  If that is not helpful additionally we can either try hydrochlorothiazide since he does have history of kidney stones or amlodipine small dose.  He will come back in 2 weeks to repeat potassium levels and will leave me a message with his blood pressure readings at home.  - losartan (COZAAR) 50 MG tablet; Take 1 tablet (50 mg total) by mouth daily.  Dispense: 30 tablet; Refill: 2  - Basic Metabolic Panel; Future    2.  Type 2 diabetes.  Currently mild.  He is on metformin and recent A1c was 6.1.    Health maintenance: Discussed that he absolutely must get pneumonia shot and flu shot this year.  Since he does not have insurance it might be cheaper to get it at the pharmacy.    Yudith Flaherty MD    Chief Complaint:  Chief Complaint   Patient presents with     Annual Exam     Pt is fasting, DOT physical  noticed high blood pressure       History of Present Illness:  Tano is a 60 y.o. male , prior pt of Dr Pete's, with history of high blood pressure, kidney stones, hyperlipidemia, cholecystectomy who is currently here for acute visit due to concerns about elevated blood pressure.    Of note patient did lose his job during coughing and currently does not have health insurance but did apply for [employer].  He is applying for a job to be a a  and has had his DOT physical when his blood pressure was found to be elevated.  Initially it was 178/82, subsequently 160/82.  Of note on his last visit here in August his blood pressure was 142/70 and heart rate was 55.  Previously to that blood pressure was well controlled until 2019.  Interestingly his weight has been stable and if anything he lost weight.  He denies alcohol intake but does consume salty things.  He  "does have family history of hypertension.    Review of Systems:  A comprehensive review of systems was performed and was otherwise negative    PFSH:  Social History: Reviewed  Social History     Tobacco Use   Smoking Status Former Smoker   Smokeless Tobacco Never Used     Social History     Social History Narrative    Family is back home in [place] [facility] [place]    Works at [employer]         Past History: Reviewed  Current Outpatient Medications   Medication Sig Dispense Refill     aspirin 81 MG EC tablet Take 1 tablet (81 mg total) by mouth daily. 90 tablet 3     blood glucose meter (GLUCOMETER) Use 1 each As Directed as needed. Dispense glucometer brand per patient's insurance at pharmacy discretion. (E11.9) 1 each 0     blood glucose test (ONETOUCH ULTRA BLUE TEST STRIP) strips Apply 1 each topically daily. Dispense brand per patient's insurance at pharmacy discretion. 100 strip 0     generic lancets Use 1 each As Directed daily. Dispense brand per patient's insurance at pharmacy discretion. (E11.9) 100 each 0     metFORMIN (GLUCOPHAGE) 1000 MG tablet TAKE 1 TAB BY MOUTH TWICE DAILY 60 tablet 0     ONETOUCH DELICA LANCETS 30 gauge Misc USE ONCE DAILY 100 each 3     simvastatin (ZOCOR) 20 MG tablet Take 1 tablet (20 mg total) by mouth at bedtime. 30 tablet 0     losartan (COZAAR) 50 MG tablet Take 1 tablet (50 mg total) by mouth daily. 30 tablet 2     No current facility-administered medications for this visit.        Family History: Reviewed    Physical Exam:    Vitals:    09/28/20 1207   BP: 142/70   Patient Site: Left Arm   Patient Position: Sitting   Pulse: (!) 55   SpO2: 97%   Weight: 201 lb (91.2 kg)   Height: 5' 11\" (1.803 m)     Wt Readings from Last 3 Encounters:   09/28/20 201 lb (91.2 kg)   08/19/20 199 lb (90.3 kg)   10/15/19 209 lb 14.4 oz (95.2 kg)     Body mass index is 28.03 kg/m .    Constitutional:  Reveals a pleasant male.  Vitals:  Per nursing notes.  HEENT:No cervical LAD, no thyromegaly,  conjunctiva is " pink, no scleral icterus, TMs are visualized and normal bl, oropharynx is clear, no exudates,   Cardiac:  Regular rate and rhythm,no murmurs, rubs, or gallops. Legs without edema. Palpation of the radial pulse regular.  Lungs: Clear to auscultation bl.  Respiratory effort normal.  Abdomen:positive BS, soft, nontender, nondistended.  No hepato-splenomagaly  Skin:   Without rash, bruise, or palpable lesions.  Rheumatologic: Normal joints and nails of the hands.  Neurologic:  Cranial nerves II-XII intact.     Psychiatric: affect appropriate, memory intact.     Data Review:    Analysis and Summary of Old Records (2): yes      Records Requested (1): no      Other History Summarized (from other people in the room) (2): no    Radiology Tests Summarized (XRAY/CT/MRI/DXA) (1): no    Labs Reviewed (1): yes    Medicine Tests Reviewed (EKG/ECHO/COLONOSCOPY/EGD) (1): no    Independent Review of EKG or X-RAY (2): no

## 2021-06-13 NOTE — TELEPHONE ENCOUNTER
Refill Approved    Rx renewed per Medication Renewal Policy. Medication was last renewed on 8/19/20.    Reina Loo, [facility] Triage/Med Refill 12/22/2020     Requested Prescriptions   Pending Prescriptions Disp Refills     metFORMIN (GLUCOPHAGE) 1000 MG tablet [Pharmacy Med Name: METFORMIN HCL 1,000 MG TABLET] 180 tablet 1     Sig: TAKE 1 TABLET BY MOUTH TWICE A DAY       Metformin Refill Protocol Passed - 12/19/2020  9:31 AM        Passed - Blood pressure in last 12 months     BP Readings from Last 1 Encounters:   09/28/20 142/70             Passed - LFT or AST or ALT in last 12 months     Albumin   Date Value Ref Range Status   08/19/2020 3.9 3.5 - 5.0 g/dL Final     Bilirubin, Total   Date Value Ref Range Status   08/19/2020 0.4 0.0 - 1.0 mg/dL Final     Bilirubin, Direct   Date Value Ref Range Status   06/13/2018 0.1 <=0.5 mg/dL Final     Alkaline Phosphatase   Date Value Ref Range Status   08/19/2020 41 (L) 45 - 120 U/L Final     AST   Date Value Ref Range Status   08/19/2020 20 0 - 40 U/L Final     ALT   Date Value Ref Range Status   08/19/2020 13 0 - 45 U/L Final     Protein, Total   Date Value Ref Range Status   08/19/2020 7.2 6.0 - 8.0 g/dL Final                Passed - GFR or Serum Creatinine in last 6 months     GFR MDRD Non Af Amer   Date Value Ref Range Status   08/19/2020 >60 >60 mL/min/1.73m2 Final     GFR MDRD Af Amer   Date Value Ref Range Status   08/19/2020 >60 >60 mL/min/1.73m2 Final             Passed - Visit with PCP or prescribing provider visit in last 6 months or next 3 months     Last office visit with prescriber/PCP: Visit date not found OR same dept: Visit date not found OR same specialty: 8/19/2020 Natanael Adams MD Last physical: Visit date not found Last MTM visit: Visit date not found         Next appt within 3 mo: Visit date not found  Next physical within 3 mo: Visit date not found  Prescriber OR PCP: Carlos Pete DO  Last diagnosis associated with med order: 1.  Type 2 diabetes mellitus (H)  - metFORMIN (GLUCOPHAGE) 1000 MG tablet [Pharmacy Med Name: METFORMIN HCL 1,000 MG TABLET]; TAKE 1 TABLET BY MOUTH TWICE A DAY  Dispense: 180 tablet; Refill: 1     If protocol passes may refill for 12 months if within 3 months of last provider visit (or a total of 15 months).           Passed - A1C in last 6 months     Hemoglobin A1c   Date Value Ref Range Status   08/19/2020 6.1 (H) <=5.6 % Final     Comment:     Normal <5.7% Prediabete 5.7-6.4% Diabletes 6.5% or higher - adopted from ADA consensus guidelines               Passed - Microalbumin in last year      Microalbumin, Random Urine   Date Value Ref Range Status   08/19/2020 <0.50 0.00 - 1.99 mg/dL Final

## 2021-06-13 NOTE — TELEPHONE ENCOUNTER
Refill Approved    Rx renewed per Medication Renewal Policy. Medication was last renewed on 10/15/19.    Reina Loo, [facility] Connection Triage/Med Refill 11/16/2020     Requested Prescriptions   Pending Prescriptions Disp Refills     aspirin 81 MG EC tablet [Pharmacy Med Name: ASPIRIN EC 81 MG TABLET] 90 tablet 1     Sig: TAKE 1 TABLET BY MOUTH EVERY DAY       Aspirin/Dipyridamole Refill Protocol Passed - 11/14/2020  9:15 AM        Passed - PCP or prescribing provider visit in past 12 months       Last office visit with prescriber/PCP: 10/15/2019 Carlos Pete DO OR same dept: Visit date not found OR same specialty: 8/19/2020 Natanael Adams MD  Last physical: 6/13/2018 Last MTM visit: Visit date not found    Next appt within 3 mo: Visit date not found Next physical within 3 mo: Visit date not found  Prescriber OR PCP: Carlos Pete DO  Last diagnosis associated with med order: 1. Type 2 diabetes mellitus (H)  - aspirin 81 MG EC tablet [Pharmacy Med Name: ASPIRIN EC 81 MG TABLET]; TAKE 1 TABLET BY MOUTH EVERY DAY  Dispense: 90 tablet; Refill: 1    If protocol passes may refill for 6 months if within 3 months of last provider visit (or a total of 9 months).

## 2021-06-14 NOTE — TELEPHONE ENCOUNTER
Pt is seeing dr watts on 1- and asking RX for blood pressure be refilled    PHARM - [facility] [address]    Please call pt when done: [phone] AIMEOK

## 2021-06-14 NOTE — TELEPHONE ENCOUNTER
Please let pt know.    Blood work shows that his vitamin B12 level is very low. This can affect nervous system. He should start B12 supplement OTC, 1000 mcg a day.    Vitamin D level is also super low. Since he had h/o kidney stones, I can not put him on a high dose vit D replacement, but he should take smaller vitamin D dose daily to help bring levels up: 2,000 units a day. This is important for his bone health.    I sent scripts to his pharacy.    Res of blood work is good, we will send his result letter.

## 2021-06-14 NOTE — TELEPHONE ENCOUNTER
Refill Approved    Rx renewed per Medication Renewal Policy. Medication was last renewed on 01/24/2021. Patient needs scripts sent to [facility] and [facility],    Inga Salcedo, [facility] Triage/Med Refill 1/25/2021     Requested Prescriptions   Pending Prescriptions Disp Refills     cholecalciferol, vitamin D3, 50 mcg (2,000 unit) capsule 90 capsule 2     Sig: Take 1 capsule (2,000 Units total) by mouth daily.       There is no refill protocol information for this order        cyanocobalamin 1000 MCG tablet 100 tablet 2     Sig: Take 1 tablet (1,000 mcg total) by mouth daily.       Cyanocobalamin (Vitamin B12)  Refill Protocol Passed - 1/25/2021 10:33 AM        Passed - PCP or prescribing provider visit in past 12 months       Last office visit with prescriber/PCP: 10/15/2019 Carlos Pete DO OR same dept: 1/21/2021 Yudith Flaherty MD OR same specialty: 1/21/2021 Yudith Flaherty MD Last physical: 6/13/2018 Last MTM visit: Visit date not found    Next visit within 3 mo: Visit date not found  Next physical within 3 mo: Visit date not found  Prescriber OR PCP: Carlos Pete DO  Last diagnosis associated with med order: 1. Vitamin D deficiency  - cholecalciferol, vitamin D3, 50 mcg (2,000 unit) capsule; Take 1 capsule (2,000 Units total) by mouth daily.  Dispense: 90 capsule; Refill: 2    2. Vitamin B12 deficiency (non anemic)  - cyanocobalamin 1000 MCG tablet; Take 1 tablet (1,000 mcg total) by mouth daily.  Dispense: 100 tablet; Refill: 2               Passed - Vitamin B12 level in last 12 months     Vitamin B-12   Date Value Ref Range Status   01/21/2021 170 (L) 213 - 816 pg/mL Final             Passed - CBC in last 12 months     WBC   Date Value Ref Range Status   01/21/2021 8.3 4.0 - 11.0 thou/uL Final     RBC   Date Value Ref Range Status   01/21/2021 5.23 4.40 - 6.20 mill/uL Final     Hemoglobin   Date Value Ref Range Status   01/21/2021 14.9 14.0 - 18.0 g/dL Final     Hematocrit    Date Value Ref Range Status   01/21/2021 43.6 40.0 - 54.0 % Final     MCV   Date Value Ref Range Status   01/21/2021 83 80 - 100 fL Final     MCH   Date Value Ref Range Status   01/21/2021 28.4 27.0 - 34.0 pg Final     MCHC   Date Value Ref Range Status   01/21/2021 34.1 32.0 - 36.0 g/dL Final     RDW   Date Value Ref Range Status   01/21/2021 11.0 11.0 - 14.5 % Final     Platelets   Date Value Ref Range Status   01/21/2021 164 140 - 440 thou/uL Final     MPV   Date Value Ref Range Status   01/21/2021 8.6 7.0 - 10.0 fL Final

## 2021-06-14 NOTE — PROGRESS NOTES
[facility] Follow Up Note    Assessment/Plan:  1. Annual physical exam  We will do blood work today.  He had polyps on colonoscopy in 2014 and I recommended that he has it done later this year.  We will give him pneumonia shot today.  Encouraged him to get coronavirus vaccination when it becomes available.  - Hepatitis C Antibody (Anti-HCV)  - Ambulatory referral for Colonoscopy - [facility]  - PSA, Annual Screen (Prostatic-Specific Antigen)    2. Type 2 diabetes mellitus without complication, without long-term current use of insulin (H)  A1c historically is well controlled on Metformin only.  Discussed weight loss.  He will continue aspirin and Zocor.  Strongly encouraged him to have eye exam done once a year and referral was placed.  - Ambulatory referral to Ophthalmology  - aspirin 81 MG EC tablet; Take 1 tablet (81 mg total) by mouth daily.  Dispense: 90 tablet; Refill: 3  - Glycosylated Hemoglobin A1c  - Comprehensive Metabolic Panel  - Vitamin B12  - Microalbumin, Random Urine    3. Hypercholesterolemia  He is on Zocor  - Comprehensive Metabolic Panel  - Lipid Cascade FASTING    4. Essential hypertension  Blood pressure today is borderline. He is on losartan 50 mg a day.  Ask him to check blood pressures at home and follow-up in 3 months, we can consider adding HCTZ or amlodipine if it continues to be above goal.  - Comprehensive Metabolic Panel  - HM1(CBC and Differential)    5. Need for vaccination  Pneumonia shot administered    6. Vitamin D deficiency  He is not taking any vitamins, of note he does have history of kidney stones in the past.  - Vitamin D, Total (25-Hydroxy)      Yudith Flaherty MD    Chief Complaint:  Chief Complaint   Patient presents with     Hypertension     BP & med check        History of Present Illness:  Tano is a 60 y.o. male, prior patient of Dr. Pete, with history of type 2 diabetes, high blood pressure, kidney stones, hyperlipidemia and  cholecystectomy.  She is currently here for a physical.  She was able to renew his insurance.  He denies any concerns or complaints.    Patient works as a  and is not very physically active.  For diabetes he is on Metformin twice a day and previous A1c was around 6.  He has been overweight and currently has been gaining weight slightly.  He denies exertional chest pains or shortness of breath.  Blood pressure has been borderline but today is 130/70.  She is on losartan 50 mg a day, ask him to check blood pressure at home and if it still elevated we can consider adding hydrochlorothiazide or amlodipine to his regimen.    Review of Systems:  A comprehensive review of systems was performed and was otherwise negative    PFSH:  Social History: Reviewed he works as a   Social History     Tobacco Use   Smoking Status Former Smoker   Smokeless Tobacco Never Used     Social History     Social History Narrative    Family is back home in [place]    Works at [employer]         Past History: Reviewed  Current Outpatient Medications   Medication Sig Dispense Refill     aspirin 81 MG EC tablet Take 1 tablet (81 mg total) by mouth daily. 90 tablet 3     blood glucose meter (GLUCOMETER) Use 1 each As Directed as needed. Dispense glucometer brand per patient's insurance at pharmacy discretion. (E11.9) 1 each 0     blood glucose test (ONETOUCH ULTRA BLUE TEST STRIP) strips Apply 1 each topically daily. Dispense brand per patient's insurance at pharmacy discretion. 100 strip 0     generic lancets Use 1 each As Directed daily. Dispense brand per patient's insurance at pharmacy discretion. (E11.9) 100 each 0     losartan (COZAAR) 50 MG tablet Take 1 tablet (50 mg total) by mouth daily. 90 tablet 3     metFORMIN (GLUCOPHAGE) 1000 MG tablet TAKE 1 TABLET BY MOUTH TWICE A  tablet 1     ONETOUCH DELICA LANCETS 30 gauge Misc USE ONCE DAILY 100 each 3     simvastatin (ZOCOR) 20 MG tablet TAKE 1 TABLET BY MOUTH EVERY DAY  "90 tablet 3     No current facility-administered medications for this visit.        Family History: Viewed    Physical Exam:    Vitals:    01/21/21 1356   BP: 130/68   Patient Site: Left Arm   Patient Position: Sitting   Cuff Size: Adult Regular   Pulse: (!) 58   Temp: 97.8  F (36.6  C)   SpO2: 98%   Weight: 206 lb (93.4 kg)   Height: 5' 11\" (1.803 m)     Wt Readings from Last 3 Encounters:   01/21/21 206 lb (93.4 kg)   09/28/20 201 lb (91.2 kg)   08/19/20 199 lb (90.3 kg)     Body mass index is 28.73 kg/m .    Constitutional:  Reveals a pleasant male.  Vitals:  Per nursing notes.  HEENT:No cervical LAD, no thyromegaly,  conjunctiva is pink, no scleral icterus, TMs are visualized and normal bl, oropharynx is clear, no exudates,   Cardiac:  Regular rate and rhythm,no murmurs, rubs, or gallops.  Legs without edema. Palpation of the radial pulse regular.  Lungs: Clear to auscultation bl.  Respiratory effort normal.  Abdomen:positive BS, soft, nontender, nondistended.  No hepato-splenomagaly  Skin:   Without rash, bruise, or palpable lesions.  Rheumatologic: Normal joints and nails of the hands.  Neurologic:  Cranial nerves II-XII intact.     Psychiatric: affect appropriate, memory intact.   Lower extremities: DP pulses are poorly palpable, feet are warm, monofilament test is normal.    Data Review:    Analysis and Summary of Old Records (2): yes      Records Requested (1): no      Other History Summarized (from other people in the room) (2): no    Radiology Tests Summarized (XRAY/CT/MRI/DXA) (1): no    Labs Reviewed (1): yes    Medicine Tests Reviewed (EKG/ECHO/COLONOSCOPY/EGD) (1): colo    Independent Review of EKG or X-RAY (2): no      "

## 2021-06-14 NOTE — PATIENT INSTRUCTIONS - HE
1. Blood work today    2. Have eye exam done every ear    3. Schedule colonoscopy this year due to history of polyp    4. Check b/p at home, gola b/p <130/80, if higher, ill need start on b/p medication    5. Pneumonia shot today

## 2021-06-16 PROBLEM — I10 ESSENTIAL HYPERTENSION: Status: ACTIVE | Noted: 2020-09-28

## 2021-06-16 PROBLEM — Z12.5 PROSTATE CANCER SCREENING: Status: ACTIVE | Noted: 2018-06-13

## 2021-06-16 NOTE — PROGRESS NOTES
[facility] Follow Up Note    Assessment/Plan:    1. Essential hypertension  Pressure is above goal.  He has been out of losartan 50 mg for 2 weeks and has not refilled his prescription.  It appears that he is not quite aware of how easily he can order refill through automated phone call to the pharmacy.  Did discuss how to do it.  I also offered refills through [facility] to be mailed to him but he refused.  Discussed to take his medications consistently and let me know if blood pressure at home is above 130.  Weight loss was recommended.    2. Type 2 diabetes mellitus without complication, without long-term current use of insulin (H)  Sugars at home are good.  A1c in January was at range.  We will continue checking it every 6 months.  He is requesting test strips.  - blood glucose test (ONETOUCH ULTRA BLUE TEST STRIP) strips; Apply 1 each topically daily. Dispense brand per patient's insurance at pharmacy discretion.  Dispense: 100 strip; Refill: 3    3. Vitamin B12 deficiency (non anemic)  She took thousand MCG's of B12 for 3 months only.  Recently ran out and did not refill.  Will check levels to see if he needs to stay on a high dose but discussed that he will need it long-term  - Vitamin B12  - Folate, Serum    4. Vitamin D deficiency  Vitamin D level was low.  Due to history of kidney stones only prescribed vitamin D supplement 2000 units a day.  He ran out of his 3-month supply and did not refill yet.  Discussed to request refills through the pharmacy and take long-term.      Yudith Flaherty MD    Chief Complaint:  Chief Complaint   Patient presents with     Follow-up     Diabetes     Medication Management       History of Present Illness:  Tano is a 60 y.o. male with history of type 2 diabetes, high blood pressure, kidney stones, hyperlipidemia and cholecystectomy.  Here for follow up.    On his last physical he was noted to have B12 and vitamin D deficient.  He has taken supplements  for 3 months but currently ran out and did not refill prescriptions.    His blood pressure is also mildly elevated today.  He has been out of losartan for 2 weeks.  He reports that sometimes he does not have time due to work to  his prescriptions.  Discussed an easier way to order refills through call into the pharmacy.    His sugars have been good, 80s and 110s.  He is on Metformin.  He is getting his Covid shot tomorrow.      Review of Systems:  A comprehensive review of systems was performed and was otherwise negative    PFSH:  Social History: Reviewed  Social History     Tobacco Use   Smoking Status Former Smoker   Smokeless Tobacco Never Used     Social History     Social History Narrative    Family is back home in [place]    Works at [employer]         Past History: Reviewed  Current Outpatient Medications   Medication Sig Dispense Refill     aspirin 81 MG EC tablet Take 1 tablet (81 mg total) by mouth daily. 90 tablet 3     blood glucose meter (GLUCOMETER) Use 1 each As Directed as needed. Dispense glucometer brand per patient's insurance at pharmacy discretion. (E11.9) 1 each 0     blood glucose test (ONETOUCH ULTRA BLUE TEST STRIP) strips Apply 1 each topically daily. Dispense brand per patient's insurance at pharmacy discretion. 100 strip 3     cholecalciferol, vitamin D3, 50 mcg (2,000 unit) capsule Take 1 capsule (2,000 Units total) by mouth daily. 90 capsule 2     cyanocobalamin 1000 MCG tablet Take 1 tablet (1,000 mcg total) by mouth daily. 100 tablet 2     generic lancets Use 1 each As Directed daily. Dispense brand per patient's insurance at pharmacy discretion. (E11.9) 100 each 0     losartan (COZAAR) 50 MG tablet Take 1 tablet (50 mg total) by mouth daily. 90 tablet 3     metFORMIN (GLUCOPHAGE) 1000 MG tablet TAKE 1 TABLET BY MOUTH TWICE A  tablet 1     ONETOUCH DELICA LANCETS 30 gauge Misc USE ONCE DAILY 100 each 3     simvastatin (ZOCOR) 20 MG tablet TAKE 1 TABLET BY MOUTH EVERY DAY 90  "tablet 3     No current facility-administered medications for this visit.        Family History: Reviewed    Physical Exam:    Vitals:    04/21/21 0939 04/21/21 0942   BP: 146/62 138/66   Patient Site: Left Arm Right Arm   Patient Position: Sitting Sitting   Cuff Size: Adult Large Adult Large   Pulse: (!) 52    SpO2: 100%    Weight: 210 lb (95.3 kg)    Height: 5' 11\" (1.803 m)      Wt Readings from Last 3 Encounters:   04/21/21 210 lb (95.3 kg)   01/21/21 206 lb (93.4 kg)   09/28/20 201 lb (91.2 kg)     Body mass index is 29.29 kg/m .    Constitutional:  Reveals a pleasant male.  Vitals:  Per nursing notes.  HEENT:No cervical LAD, no thyromegaly,  conjunctiva is pink, no scleral icterus, TMs are visualized and normal bl, oropharynx is clear, no exudates,   Cardiac:  Regular rate and rhythm,no murmurs, rubs, or gallops. . Legs without edema. Palpation of the radial pulse regular.  Lungs: Clear to auscultation bl.  Respiratory effort normal.  Abdomen:positive BS, soft, nontender, nondistended.  No hepato-splenomagaly  Skin:   Without rash, bruise, or palpable lesions.  Rheumatologic: Normal joints and nails of the hands.  Neurologic:  Cranial nerves II-XII intact.     Psychiatric: affect appropriate, memory intact.     Data Review:    Analysis and Summary of Old Records (2): yes      Records Requested (1): no      Other History Summarized (from other people in the room) (2): no    Radiology Tests Summarized (XRAY/CT/MRI/DXA) (1): no    Labs Reviewed (1): yes    Medicine Tests Reviewed (EKG/ECHO/COLONOSCOPY/EGD) (1): no    Independent Review of EKG or X-RAY (2): no      "

## 2021-06-16 NOTE — PATIENT INSTRUCTIONS - HE
1. Pill box has information:    Number refills left - if zero, call us for refill    Also prescription number: you can use t to refill it over the phone when call pharmacy, automatically.    2. Take p/p medication consistently, goal b/p <130/80    3. Continue B12 supplement and vitamin D supplement long term (get refills from the pharmacy).

## 2021-06-17 NOTE — TELEPHONE ENCOUNTER
Central PA team  [phone]  Pool: JARED PA MED ([id number])          PA has been initiated.       PA form completed and faxed insurance via Cover My Meds     Key:  [id number] - Rx #: [id number]     Medication:  OneTouch Ultra strips    Insurance:  [employer] [place]        Response will be received via fax and may take up to 5-10 business days depending on plan

## 2021-06-17 NOTE — TELEPHONE ENCOUNTER
Telephone Encounter by Susan George at 5/7/2021  8:19 AM     Author: Susan George Service: -- Author Type: --    Filed: 5/7/2021  8:20 AM Encounter Date: 4/28/2021 Status: Signed    : Susan George       PRIOR AUTHORIZATION DENIED    Denial Rational:  Patient needs to use preferred products:

## 2021-06-17 NOTE — TELEPHONE ENCOUNTER
Reason for Call:  Medication or medication refill:  Patient stating RX's for Metformin,Aspirin   Have not been sent in  -  Please send to pharmacy - patient is out of meds  Do you use a [facility]?  Name of the pharmacy and phone number for the current request:   [facility] [address]    Name of the medication requested:   Losartan  Vitamin b12  VItamin D3  Metformin  Aspirin    Pt is stating these were not sent to his pharmacy    Other request: n/a    Can we leave a detailed message on this number? Yes    Phone number patient can be reached at:   Cell number on file:    Telephone Information:   Mobile [phone]       Best Time: asap    Call taken on 4/27/2021 at 11:18 AM by Joselin Guardado

## 2021-06-18 NOTE — LETTER
Letter by Karen Pete DO at      Author: Karen Pete DO Service: -- Author Type: --    Filed:  Encounter Date: 1/17/2019 Status: (Other)       Tano Garcia  [address]  [place] [address]             January 17, 2019         Dear Mr. Garcia,    Below are the results from your recent visit:    Resulted Orders   CT Head Without Contrast    Narrative    [facility] RADIOLOGY    EXAM: CT HEAD WO CONTRAST  LOCATION: [facility]  DATE/TIME: 1/17/2019 2:44 PM    INDICATION: Fall.  COMPARISON: None.  TECHNIQUE: Routine without IV contrast. Multiplanar reformats. Dose reduction techniques were used.    FINDINGS:    There is no evidence of acute intracranial hemorrhage or extra-axial collection. No mass effect or midline shift is apparent.    Diffuse and proportionate prominence of the ventricles, sulci and cisterns is compatible with mild generalized parenchymal atrophy. The sella is unremarkable for technique. The cerebellar tonsils are appropriately positioned.    There is mild scattered white matter hypoattenuation, which is nonspecific. This commonly reflects chronic small vessel ischemic change. No sites of effaced gray-white differentiation are apparent.  The deep gray nuclei are symmetric and unremarkable.    The imaged paranasal sinuses, mastoid air cells and middle ear cavities are well aerated.    The calvarium is unremarkable. The globes and orbits appear intact, as are the extracranial soft tissues.      Impression    CONCLUSION:  1.  No evidence of acute hemorrhage or other acute intracranial abnormality.  2.  No evidence of acute calvarial fracture.    Results discussed with KAREN PETE on 1/17/2019 2:52 PM.       Here is a copy of the CT, it was normal as we discussed on the phone.      Please call with questions or contact us using [employer].    Sincerely,        Electronically signed by Karen Pete DO

## 2021-06-18 NOTE — PROGRESS NOTES
[facility] Note    Tano Garcia   57 y.o. male    Date of Visit: 6/13/2018  Chief Complaint   Patient presents with     Annual Exam     Diabetes     F/U     Medication Refill     STRIPS/METFORMIN/ZOCOR       ASSESSMENT/PLAN  1. Health maintenance examination  Lipid Profile    Basic Metabolic Panel    HM2(CBC w/o Differential)    Hepatic Profile   2. Type 2 diabetes mellitus without complication, without long-term current use of insulin  Microalbumin, Random Urine    Glycosylated Hemoglobin A1c   3. Prostate cancer screening  PSA (Prostatic-Specific Antigen), Annual Screen     ---------------------------------------------    1.  Check fasting labs today, he overall is doing well, his diabetes regimen is good, his A1c has been therapeutic as of late.  He is on aspirin and statin, meeting goal for diabetes.    2.  Check microalbumin annually, reminded of eye exam due around this time    3.  We had a discussion regarding prostate cancer screening, he elected to proceed with PSA    Return for Annual physical.      SUBJECTIVE  Tano Garcia is a healthy 57-year-old man with history of diabetes who presents for follow-up.    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.  He does not have any medical concerns today.    We talked about prostate cancer screening, he would like to pursue this.  He is originally from [place].    He is on simvastatin and aspirin as part of his diabetes regimen.  Colonoscopy is up-to-date.    He eats a healthy diet with mostly fruits and vegetables/vegetarian.  He has a family of his own, 3 children.    ROS A comprehensive review of systems was performed and was otherwise negative    Medications, allergies, and problem list were reviewed and updated    Patient Active Problem List   Diagnosis     Nephrolithiasis     Osteoarthritis     Type 2 diabetes mellitus (H)     Hyperlipidemia     Prostate cancer screening     No  past medical history on file.  Past Surgical History:   Procedure Laterality Date     SC REMOVAL GALLBLADDER      Description: Cholecystectomy;  Recorded: 07/14/2014;     Social History     Social History     Marital status:      Spouse name: N/A     Number of children: 3     Years of education: N/A     Occupational History           [employer]     Social History Main Topics     Smoking status: Former Smoker     Smokeless tobacco: Never Used     Alcohol use No     Drug use: Not on file     Sexual activity: Not on file     Other Topics Concern     Not on file     Social History Narrative    Family is back home in [place]    Works at [employer]       Family History   Problem Relation Age of Onset     No Medical Problems Mother        Current Outpatient Prescriptions   Medication Sig Dispense Refill     blood glucose meter (GLUCOMETER) Use 1 each As Directed as needed. Dispense glucometer brand per patient's insurance at pharmacy discretion. (E11.9) 1 each 0     generic lancets Use 1 each As Directed 2 (two) times a day. Dispense brand per patient's insurance at pharmacy discretion. (E11.9) 200 each 1     loratadine (CLARITIN) 10 mg tablet Take 1 tablet (10 mg total) by mouth daily. 30 tablet 3     aspirin 81 MG EC tablet Take 1 tablet (81 mg total) by mouth daily. 90 tablet 3     blood glucose test (ONETOUCH ULTRA TEST) strips USE 1 STRIP AS DIRECTED 2 TIMES A  strip 1     metFORMIN (GLUCOPHAGE) 1000 MG tablet TAKE 1 TAB BY MOUTH TWICE DAILY 180 tablet 3     simvastatin (ZOCOR) 20 MG tablet Take 1 tablet (20 mg total) by mouth at bedtime. 90 tablet 3     No current facility-administered medications for this visit.        No Known Allergies    EXAM  Vitals:    06/13/18 1423   BP: 118/70   Patient Site: Left Arm   Pulse: 68   Resp: 13   Weight: 212 lb (96.2 kg)   Height: 6' (1.829 m)         General: alert, no distress  HEENT: sclerae anicteric, moist oral mucosa  Heart: Regular rate and  rhythm, no murmurs.  No pretibial edema.  Warm extremities  Lungs: Clear to auscultation bilat  Gastrointestinal: abdomen is soft, non-tender, non-distended.    Skin: warm/dry, no rashes  Neuro: no gross abnormalities        Carlos Pete DO  Internal Medicine  [facility]

## 2021-06-19 NOTE — LETTER
"Letter by Carlos Pete DO at      Author: Carlos Pete DO Service: -- Author Type: --    Filed:  Encounter Date: 6/25/2019 Status: (Other)         Tano Garcia  [address]  [place] [address]             June 25, 2019         Dear Mr. Garcia,    Below are the results from your recent visit:    Resulted Orders   Microalbumin, Random Urine   Result Value Ref Range    Microalbumin, Random Urine <0.50 0.00 - 1.99 mg/dL    Creatinine, Urine 39.2 mg/dL    Microalbumin/Creatinine Ratio Random Urine  <=19.9 mg/g      Comment:      \"Unable to calculate: Creatinine and/or Microalbumin value below detectable level\"    Narrative    Microalbumin, Random Urine  <2.0 mg/dL . . . . . . . . Normal  3.0-30.0 mg/dL . . . . . . Microalbuminuria  >30.0 mg/dL . . . . . .  . Clinical Proteinuria    Microalbumin/Creatinine Ratio, Random Urine  <20 mg/g . . . . .. . . . Normal   mg/g . . . . . . . Microalbuminuria  >300 mg/g . . . . . . . . Clinical Proteinuria       Glycosylated Hemoglobin A1c   Result Value Ref Range    Hemoglobin A1c 6.2 (H) 3.5 - 6.0 %   Lipid Cascade   Result Value Ref Range    Cholesterol 172 <=199 mg/dL    Triglycerides 180 (H) <=149 mg/dL    HDL Cholesterol 48 >=40 mg/dL    LDL Calculated 88 <=129 mg/dL    Patient Fasting > 8hrs? Unknown    Comprehensive Metabolic Panel   Result Value Ref Range    Sodium 138 136 - 145 mmol/L    Potassium 4.3 3.5 - 5.0 mmol/L    Chloride 103 98 - 107 mmol/L    CO2 23 22 - 31 mmol/L    Anion Gap, Calculation 12 5 - 18 mmol/L    Glucose 99 70 - 125 mg/dL    BUN 6 (L) 8 - 22 mg/dL    Creatinine 0.78 0.70 - 1.30 mg/dL    GFR MDRD Af Amer >60 >60 mL/min/1.73m2    GFR MDRD Non Af Amer >60 >60 mL/min/1.73m2    Bilirubin, Total 0.4 0.0 - 1.0 mg/dL    Calcium 9.8 8.5 - 10.5 mg/dL    Protein, Total 6.7 6.0 - 8.0 g/dL    Albumin 3.9 3.5 - 5.0 g/dL    Alkaline Phosphatase 44 (L) 45 - 120 U/L    AST 23 0 - 40 U/L    ALT 22 0 - 45 U/L    " Narrative    Fasting Glucose reference range is 70-99 mg/dL per  American Diabetes Association (ADA) guidelines.   PSA (Prostatic-Specific Antigen), Annual Screen   Result Value Ref Range    PSA 0.9 0.0 - 3.5 ng/mL    Narrative    Method is Abbott Prostate-Specific Antigen (PSA)  Standard-WHO 1st International (90:10)       You are doing a very good job, keep up the good work.  Test results above are normal.    Please call with questions or contact us using [employer]Cloudt.    Sincerely,        Electronically signed by Carlos Pete DO

## 2021-06-19 NOTE — LETTER
Letter by Carlos Pete DO at      Author: Carlos Pete DO Service: -- Author Type: --    Filed:  Encounter Date: 10/15/2019 Status: Signed         Tano Garcia  [address]  [place] [address]             October 15, 2019         Dear Mr. Garcia,    Below are the results from your recent visit:    The diabetes remains very well controlled.  Keep up the good work.  Here are the last 6 A1c measures for comparison.     Lab Results   Component Value Date    HGBA1C 6.3 (H) 10/15/2019    HGBA1C 6.2 (H) 06/24/2019    HGBA1C 6.5 (H) 12/14/2018    HGBA1C 6.3 (H) 06/13/2018    HGBA1C 6.6 (H) 12/19/2017    HGBA1C 7.1 (H) 10/20/2016         Please call with questions or contact us using [employer].    Sincerely,        Electronically signed by Carlos Pete DO

## 2021-06-20 NOTE — LETTER
Letter by Blanca Pitts CHW at      Author: Blanca Pitts CHW Service: -- Author Type: --    Filed:  Encounter Date: 8/19/2020 Status: (Other)       CARE COORDINATION  [facility]  [address] [place] [address]    August 25, 2020    Tano Garcia  [address]  [place] [address]      Dear Tano,    I am a clinic community health worker  who works with Carlos Pete DO at the [facility]. I wanted to introduce myself and provide you with my contact information for you to be able to call me with any questions or concerns. Below is a description of clinic care coordination and how I can further assist you.      The clinic care coordination team is made up of a registered nurse,  and community health worker who understand the health care system. The goal of clinic care coordination is to help you manage your health and improve access to the health care system in the most efficient manner. The team can assist you in meeting your health care goals by providing education, coordinating services, strengthening the communication among your providers and supporting you with any resource needs.    Please feel free to contact the Community Health Worker at [phone] with any questions or concerns. We are focused on providing you with the highest-quality healthcare experience possible and that all starts with you.     Sincerely,     Blanca Community Health Worker

## 2021-06-20 NOTE — LETTER
"Letter by Natanael Adams MD at      Author: Natanael Adams MD Service: -- Author Type: --    Filed:  Encounter Date: 8/20/2020 Status: (Other)         Tano Garcia  [address] [place] [address]             August 20, 2020         Dear Mr. Garcia,    Below are the results from your recent visit:    Resulted Orders   Glycosylated Hemoglobin A1c   Result Value Ref Range    Hemoglobin A1c 6.1 (H) <=5.6 %      Comment:      Normal <5.7% Prediabete 5.7-6.4% Diabletes 6.5% or higher - adopted from ADA consensus guidelines   Comprehensive Metabolic Panel   Result Value Ref Range    Sodium 135 (L) 136 - 145 mmol/L    Potassium 4.1 3.5 - 5.0 mmol/L    Chloride 101 98 - 107 mmol/L    CO2 22 22 - 31 mmol/L    Anion Gap, Calculation 12 5 - 18 mmol/L    Glucose 106 70 - 125 mg/dL    BUN 3 (L) 8 - 22 mg/dL    Creatinine 0.76 0.70 - 1.30 mg/dL    GFR MDRD Af Amer >60 >60 mL/min/1.73m2    GFR MDRD Non Af Amer >60 >60 mL/min/1.73m2    Bilirubin, Total 0.4 0.0 - 1.0 mg/dL    Calcium 9.0 8.5 - 10.5 mg/dL    Protein, Total 7.2 6.0 - 8.0 g/dL    Albumin 3.9 3.5 - 5.0 g/dL    Alkaline Phosphatase 41 (L) 45 - 120 U/L    AST 20 0 - 40 U/L    ALT 13 0 - 45 U/L    Narrative    Fasting Glucose reference range is 70-99 mg/dL per  American Diabetes Association (ADA) guidelines.   Lipid Cascade   Result Value Ref Range    Cholesterol 168 <=199 mg/dL    Triglycerides 80 <=149 mg/dL    HDL Cholesterol 46 >=40 mg/dL    LDL Calculated 106 <=129 mg/dL    Patient Fasting > 8hrs? Yes    Microalbumin, Random Urine   Result Value Ref Range    Microalbumin, Random Urine <0.50 0.00 - 1.99 mg/dL    Creatinine, Urine 51.1 mg/dL    Microalbumin/Creatinine Ratio Random Urine        Comment:      \"Unable to calculate: Creatinine and/or Microalbumin value below detectable level\"    Narrative    Microalbumin, Random Urine  <2.0 mg/dL . . . . . . . . Normal  3.0-30.0 mg/dL . . . . . . Microalbuminuria  >30.0 mg/dL . . . . . .  . " Clinical Proteinuria    Microalbumin/Creatinine Ratio, Random Urine  <20 mg/g . . . . .. . . . Normal   mg/g . . . . . . . Microalbuminuria  >300 mg/g . . . . . . . . Clinical Proteinuria            Cholesterol is a little higher than it should be.  Please resume the cholesterol medicine as we discussed.   Everything else here looks pretty good.  No protein leakage in the urine.  Kidney function and liver tests are  normal.  Average sugar is excellent.  Sodium is borderline low.  This may just be a fluke.  It looks like you are  seeing Dr. Glover in the next month.  He will probably want a repeat that.     Please call with questions or contact us using [employer].    Sincerely,        Electronically signed by Natanael Adams MD

## 2021-06-21 NOTE — LETTER
Letter by Yudith Flaherty MD at      Author: Yudith Flaherty MD Service: -- Author Type: --    Filed:  Encounter Date: 4/21/2021 Status: (Other)         Tano Garcia  [address]  [place] [address]             April 21, 2021         Dear Mr. Garcia,    Below are the results from your recent visit:    B12 level has improved. Please continue taking B12 supplement daily. Can decrease dose to 500 mcg a day.    Resulted Orders   Vitamin B12   Result Value Ref Range    Vitamin B-12 351 213 - 816 pg/mL   Folate, Serum   Result Value Ref Range    Folate 11.9 >=3.5 ng/mL       Please call with questions or contact us using [employer].    Sincerely,        Electronically signed by Yudith Flaherty MD

## 2021-06-21 NOTE — LETTER
Letter by Yudith Flaherty MD at      Author: Yudith Flaherty MD Service: -- Author Type: --    Filed:  Encounter Date: 1/24/2021 Status: (Other)         Tano Garcia  [address]  [place] [address]             January 24, 2021         Dear Mr. Garcia,    Below are the results from your recent visit:    Diabetes is under good control, A1c is at goal (<7.0).                Your  vitamin B12 level is very low. This can affect nervous system. You should start B12 supplement               1000 mcg a day.                Vitamin D level is also super low. Since you had h/o kidney stones, I can not put you  on a high dose                 vit D replacement, but you should take smaller vitamin D dose daily to help bring levels up: 2,000 units              a day. This is important for your bone health.                Kidney, liver functions and cholesterol levels are good.              Prostate test is normal. Hepatitis C screen is negative.        Resulted Orders   Glycosylated Hemoglobin A1c   Result Value Ref Range    Hemoglobin A1c 6.5 (H) <=5.6 %   Comprehensive Metabolic Panel   Result Value Ref Range    Sodium 137 136 - 145 mmol/L    Potassium 3.9 3.5 - 5.0 mmol/L    Chloride 102 98 - 107 mmol/L    CO2 23 22 - 31 mmol/L    Anion Gap, Calculation 12 5 - 18 mmol/L    Glucose 106 70 - 125 mg/dL    BUN 5 (L) 8 - 22 mg/dL    Creatinine 0.83 0.70 - 1.30 mg/dL    GFR MDRD Af Amer >60 >60 mL/min/1.73m2    GFR MDRD Non Af Amer >60 >60 mL/min/1.73m2    Bilirubin, Total 0.5 0.0 - 1.0 mg/dL    Calcium 9.2 8.5 - 10.5 mg/dL    Protein, Total 7.1 6.0 - 8.0 g/dL    Albumin 4.2 3.5 - 5.0 g/dL    Alkaline Phosphatase 45 45 - 120 U/L    AST 23 0 - 40 U/L    ALT 18 0 - 45 U/L    Narrative    Fasting Glucose reference range is 70-99 mg/dL per  American Diabetes Association (ADA) guidelines.   Lipid [place] FASTING   Result Value Ref Range    Cholesterol 139 <=199 mg/dL    Triglycerides 77 <=149 mg/dL     "HDL Cholesterol 49 >=40 mg/dL    LDL Calculated 75 <=129 mg/dL    Patient Fasting > 8hrs? Unknown    Vitamin D, Total (25-Hydroxy)   Result Value Ref Range    Vitamin D, Total (25-Hydroxy) 10.0 (L) 30.0 - 80.0 ng/mL    Narrative    Deficiency <10.0 ng/mL  Insufficiency 10.0-29.9 ng/mL  Sufficiency 30.0-80.0 ng/mL  Toxicity (possible) >100.0 ng/mL   Vitamin B12   Result Value Ref Range    Vitamin B-12 170 (L) 213 - 816 pg/mL   PSA, Annual Screen (Prostatic-Specific Antigen)   Result Value Ref Range    PSA 1.1 0.0 - 3.5 ng/mL    Narrative    Method is Abbott Prostate-Specific Antigen (PSA)  Standard-WHO 1st International (90:10)   Microalbumin, Random Urine   Result Value Ref Range    Microalbumin, Random Urine <0.50 0.00 - 1.99 mg/dL    Creatinine, Urine 75.7 mg/dL    Microalbumin/Creatinine Ratio Random Urine        Comment:      \"Unable to calculate: Creatinine and/or Microalbumin value below detectable level\"    Narrative    Microalbumin, Random Urine  <2.0 mg/dL . . . . . . . . Normal  3.0-30.0 mg/dL . . . . . . Microalbuminuria  >30.0 mg/dL . . . . . .  . Clinical Proteinuria    Microalbumin/Creatinine Ratio, Random Urine  <20 mg/g . . . . .. . . . Normal   mg/g . . . . . . . Microalbuminuria  >300 mg/g . . . . . . . . Clinical Proteinuria       HM1 (CBC with Diff)   Result Value Ref Range    WBC 8.3 4.0 - 11.0 thou/uL    RBC 5.23 4.40 - 6.20 mill/uL    Hemoglobin 14.9 14.0 - 18.0 g/dL    Hematocrit 43.6 40.0 - 54.0 %    MCV 83 80 - 100 fL    MCH 28.4 27.0 - 34.0 pg    MCHC 34.1 32.0 - 36.0 g/dL    RDW 11.0 11.0 - 14.5 %    Platelets 164 140 - 440 thou/uL    MPV 8.6 7.0 - 10.0 fL    Neutrophils % 58 50 - 70 %    Lymphocytes % 34 20 - 40 %    Monocytes % 5 2 - 10 %    Eosinophils % 2 0 - 6 %    Basophils % 1 0 - 2 %    Neutrophils Absolute 4.9 2.0 - 7.7 thou/uL    Lymphocytes Absolute 2.8 0.8 - 4.4 thou/uL    Monocytes Absolute 0.4 0.0 - 0.9 thou/uL    Eosinophils Absolute 0.2 0.0 - 0.4 thou/uL    Basophils " Absolute 0.1 0.0 - 0.2 thou/uL   Hepatitis C Antibody (Anti-HCV)   Result Value Ref Range    Hepatitis C Ab Negative Negative       Please call with questions or contact us using [employer].    Sincerely,        Electronically signed by Yudith Flaherty MD

## 2021-06-22 NOTE — PROGRESS NOTES
[facility] Note    Tano Garcia   58 y.o. male    Date of Visit: 12/14/2018  Chief Complaint   Patient presents with     Follow-up       ASSESSMENT/PLAN  1. Type 2 diabetes mellitus (H)  Glycosylated Hemoglobin A1c    simvastatin (ZOCOR) 20 MG tablet    metFORMIN (GLUCOPHAGE) 1000 MG tablet    aspirin 81 MG EC tablet    LDL Cholesterol, Direct     ---------------------------------------------    Doing well, A1c at goal, no changes at this time.   Reminder to do eye exam  No evidence of end organ damage    Return in about 6 months (around 6/14/2019) for Diabetes.      SUBJECTIVE    Tano Garcia is here for diabetes follow-up of diabetes.  He checks BG each morning, low 100s.  In June A1c was 6.3.  He changed to vegetarian diet.        ROS:   Per HPI, all other systems negative     Medications, allergies, and problem list were reviewed and updated    Patient Active Problem List   Diagnosis     Nephrolithiasis     Osteoarthritis     Type 2 diabetes mellitus (H)     Hyperlipidemia     Prostate cancer screening     No past medical history on file.  Current Outpatient Medications   Medication Sig Dispense Refill     aspirin 81 MG EC tablet Take 1 tablet (81 mg total) by mouth daily. 90 tablet 3     blood glucose meter (GLUCOMETER) Use 1 each As Directed as needed. Dispense glucometer brand per patient's insurance at pharmacy discretion. (E11.9) 1 each 0     blood glucose test (ONETOUCH ULTRA TEST) strips USE 1 STRIP AS DIRECTED 2 TIMES A  strip 1     generic lancets Use 1 each As Directed 2 (two) times a day. Dispense brand per patient's insurance at pharmacy discretion. (E11.9) 200 each 1     loratadine (CLARITIN) 10 mg tablet Take 1 tablet (10 mg total) by mouth daily. 30 tablet 3     metFORMIN (GLUCOPHAGE) 1000 MG tablet TAKE 1 TAB BY MOUTH TWICE DAILY. 180 tablet 3     ONETOUCH DELICA LANCETS 30 gauge Misc USE ONCE DAILY 100 each 3     simvastatin (ZOCOR) 20 MG tablet Take  1 tablet (20 mg total) by mouth at bedtime. 90 tablet 3     No current facility-administered medications for this visit.      No Known Allergies    EXAM  Vitals:    12/14/18 1537   BP: 118/64   Patient Site: Left Arm   Patient Position: Sitting   Cuff Size: Adult Regular   Pulse: 64   SpO2: 98%   Weight: 204 lb 1.6 oz (92.6 kg)   Height: 6' (1.829 m)         GEN: alert no distress  Psych: pleasant affect      Results reviewed:   Recent Results (from the past 240 hour(s))   Glycosylated Hemoglobin A1c   Result Value Ref Range    Hemoglobin A1c 6.5 (H) 3.5 - 6.0 %   LDL Cholesterol, Direct   Result Value Ref Range    Direct LDL 73 <=129 mg/dl        Data points: 1    Carlos Pete DO  Internal Medicine  [facility]

## 2021-06-23 NOTE — TELEPHONE ENCOUNTER
Pt needs form completed and faxed to number on bottom of form , Monday, for his employer    Please call pt:  [phone] KATHARINE

## 2021-06-23 NOTE — PATIENT INSTRUCTIONS - HE
Recommend heat, ibuprofen for neck and back pain     CT scan of head to make sure there was no internal injury.  This is low suspicion but a good idea to do since you are on aspirin

## 2021-07-03 NOTE — ADDENDUM NOTE
Addendum Note by Jericho Walters RN at 1/25/2021  7:43 PM     Author: Jericho Walters RN Service: -- Author Type: Registered Nurse    Filed: 1/25/2021  7:43 PM Encounter Date: 1/25/2021 Status: Signed    : Jericho Walters RN (Registered Nurse)    Addended by: JERICHO WALTERS on: 1/25/2021 07:43 PM        Modules accepted: Orders

## 2021-07-03 NOTE — ADDENDUM NOTE
Addendum Note by Karen Pete DO at 4/21/2017  6:38 AM     Author: Karen Pete DO Service: -- Author Type: Physician    Filed: 4/21/2017  6:38 AM Encounter Date: 4/20/2017 Status: Signed    : Karen Pete DO (Physician)    Addended by: KAREN PETE on: 4/21/2017 06:38 AM        Modules accepted: Orders

## 2021-10-11 ENCOUNTER — TELEPHONE (OUTPATIENT)
Dept: INTERNAL MEDICINE | Facility: CLINIC | Age: 61
End: 2021-10-11

## 2021-10-11 DIAGNOSIS — E11.9 TYPE 2 DIABETES MELLITUS (H): Primary | ICD-10-CM

## 2021-10-11 NOTE — TELEPHONE ENCOUNTER
1. What is the nature of the form? DOT NON INSULIN    2. Has patient signed form if applicable? NA    3. Where should completed form go? Fax [phone]    4. When was patient's last appointment with [facility]? Within one year    5. If further questions or when form is completed, is it okay to leave detailed message on patient's phone? YES      Hi,   Patient was wondering if this can be sign in and fax as soon as possible. He is unable to drive due to needing this form fax in. Thanks.

## 2021-10-13 ENCOUNTER — LAB (OUTPATIENT)
Dept: LAB | Facility: CLINIC | Age: 61
End: 2021-10-13
Payer: COMMERCIAL

## 2021-10-13 DIAGNOSIS — E11.9 TYPE 2 DIABETES MELLITUS (H): ICD-10-CM

## 2021-10-13 LAB — HBA1C MFR BLD: 6.7 % (ref 0–5.6)

## 2021-10-13 PROCEDURE — 83036 HEMOGLOBIN GLYCOSYLATED A1C: CPT

## 2021-10-13 PROCEDURE — 36415 COLL VENOUS BLD VENIPUNCTURE: CPT

## 2021-10-14 ENCOUNTER — OFFICE VISIT (OUTPATIENT)
Dept: INTERNAL MEDICINE | Facility: CLINIC | Age: 61
End: 2021-10-14
Payer: COMMERCIAL

## 2021-10-14 ENCOUNTER — TRANSFERRED RECORDS (OUTPATIENT)
Dept: HEALTH INFORMATION MANAGEMENT | Facility: CLINIC | Age: 61
End: 2021-10-14

## 2021-10-14 VITALS
DIASTOLIC BLOOD PRESSURE: 68 MMHG | HEART RATE: 74 BPM | WEIGHT: 208 LBS | BODY MASS INDEX: 29.12 KG/M2 | HEIGHT: 71 IN | SYSTOLIC BLOOD PRESSURE: 134 MMHG | OXYGEN SATURATION: 97 %

## 2021-10-14 DIAGNOSIS — E55.9 VITAMIN D DEFICIENCY: ICD-10-CM

## 2021-10-14 DIAGNOSIS — Z00.00 ANNUAL PHYSICAL EXAM: Primary | ICD-10-CM

## 2021-10-14 DIAGNOSIS — I10 ESSENTIAL HYPERTENSION: ICD-10-CM

## 2021-10-14 DIAGNOSIS — E78.00 HYPERCHOLESTEROLEMIA: ICD-10-CM

## 2021-10-14 DIAGNOSIS — E11.9 TYPE 2 DIABETES MELLITUS WITHOUT COMPLICATION, WITHOUT LONG-TERM CURRENT USE OF INSULIN (H): ICD-10-CM

## 2021-10-14 DIAGNOSIS — Z12.11 COLON CANCER SCREENING: ICD-10-CM

## 2021-10-14 DIAGNOSIS — Z12.5 PROSTATE CANCER SCREENING: ICD-10-CM

## 2021-10-14 DIAGNOSIS — E53.8 VITAMIN B12 DEFICIENCY (NON ANEMIC): ICD-10-CM

## 2021-10-14 LAB
BASOPHILS # BLD AUTO: 0 10E3/UL (ref 0–0.2)
BASOPHILS NFR BLD AUTO: 0 %
EOSINOPHIL # BLD AUTO: 0.1 10E3/UL (ref 0–0.7)
EOSINOPHIL NFR BLD AUTO: 1 %
ERYTHROCYTE [DISTWIDTH] IN BLOOD BY AUTOMATED COUNT: 14 % (ref 10–15)
HCT VFR BLD AUTO: 42.7 % (ref 40–53)
HGB BLD-MCNC: 13.9 G/DL (ref 13.3–17.7)
IMM GRANULOCYTES # BLD: 0 10E3/UL
IMM GRANULOCYTES NFR BLD: 0 %
LYMPHOCYTES # BLD AUTO: 2.9 10E3/UL (ref 0.8–5.3)
LYMPHOCYTES NFR BLD AUTO: 32 %
MCH RBC QN AUTO: 27.2 PG (ref 26.5–33)
MCHC RBC AUTO-ENTMCNC: 32.6 G/DL (ref 31.5–36.5)
MCV RBC AUTO: 84 FL (ref 78–100)
MONOCYTES # BLD AUTO: 0.8 10E3/UL (ref 0–1.3)
MONOCYTES NFR BLD AUTO: 9 %
NEUTROPHILS # BLD AUTO: 5.3 10E3/UL (ref 1.6–8.3)
NEUTROPHILS NFR BLD AUTO: 58 %
PLATELET # BLD AUTO: 210 10E3/UL (ref 150–450)
RBC # BLD AUTO: 5.11 10E6/UL (ref 4.4–5.9)
RETINOPATHY: NEGATIVE
WBC # BLD AUTO: 9.2 10E3/UL (ref 4–11)

## 2021-10-14 PROCEDURE — G0103 PSA SCREENING: HCPCS | Performed by: INTERNAL MEDICINE

## 2021-10-14 PROCEDURE — 82607 VITAMIN B-12: CPT | Performed by: INTERNAL MEDICINE

## 2021-10-14 PROCEDURE — 82306 VITAMIN D 25 HYDROXY: CPT | Performed by: INTERNAL MEDICINE

## 2021-10-14 PROCEDURE — 84156 ASSAY OF PROTEIN URINE: CPT | Performed by: INTERNAL MEDICINE

## 2021-10-14 PROCEDURE — 36415 COLL VENOUS BLD VENIPUNCTURE: CPT | Performed by: INTERNAL MEDICINE

## 2021-10-14 PROCEDURE — 85025 COMPLETE CBC W/AUTO DIFF WBC: CPT | Performed by: INTERNAL MEDICINE

## 2021-10-14 PROCEDURE — 90471 IMMUNIZATION ADMIN: CPT | Performed by: INTERNAL MEDICINE

## 2021-10-14 PROCEDURE — 90682 RIV4 VACC RECOMBINANT DNA IM: CPT | Performed by: INTERNAL MEDICINE

## 2021-10-14 PROCEDURE — 80053 COMPREHEN METABOLIC PANEL: CPT | Performed by: INTERNAL MEDICINE

## 2021-10-14 PROCEDURE — 80061 LIPID PANEL: CPT | Performed by: INTERNAL MEDICINE

## 2021-10-14 PROCEDURE — 99396 PREV VISIT EST AGE 40-64: CPT | Mod: 25 | Performed by: INTERNAL MEDICINE

## 2021-10-14 RX ORDER — SIMVASTATIN 20 MG
TABLET ORAL
Qty: 90 TABLET | Refills: 3 | Status: SHIPPED | OUTPATIENT
Start: 2021-10-14 | End: 2022-04-28

## 2021-10-14 ASSESSMENT — MIFFLIN-ST. JEOR: SCORE: 1780.61

## 2021-10-14 NOTE — LETTER
October 20, 2021      Tano Garcia  [address] [place] [address]        Dear ,    We are writing to inform you of your test results.  Vitamin D level is slightly low, please add extra vitamin D 1000 units a day.    LDL cholesterol is above goal, please re-start Simvastatin medication to lower it.    Kidney, liver functions, and sugars are good.    B12 levels are good.    Prostate marker/ PSA is normal.  Resulted Orders   Lipid panel reflex to direct LDL Fasting   Result Value Ref Range    Cholesterol 200 (H) <=199 mg/dL    Triglycerides 99 <=149 mg/dL    Direct Measure HDL 56 >=40 mg/dL      Comment:      HDL Cholesterol Reference Range:     0-2 years:   No reference ranges established for patients under 2 years old  at [email or web address] for lipid analytes.    2-8 years:  Greater than 45 mg/dL     18 years and older:   Female: Greater than or equal to 50 mg/dL   Male:   Greater than or equal to 40 mg/dL    LDL Cholesterol Calculated 124 <=129 mg/dL    Patient Fasting > 8hrs? No    Comprehensive metabolic panel   Result Value Ref Range    Sodium 135 (L) 136 - 145 mmol/L    Potassium 4.0 3.5 - 5.0 mmol/L    Chloride 99 98 - 107 mmol/L    Carbon Dioxide (CO2) 26 22 - 31 mmol/L    Anion Gap 10 5 - 18 mmol/L    Urea Nitrogen 8 8 - 22 mg/dL    Creatinine 0.88 0.70 - 1.30 mg/dL    Calcium 9.6 8.5 - 10.5 mg/dL    Glucose 98 70 - 125 mg/dL    Alkaline Phosphatase 43 (L) 45 - 120 U/L    AST 25 0 - 40 U/L    ALT 16 0 - 45 U/L    Protein Total 7.0 6.0 - 8.0 g/dL    Albumin 3.9 3.5 - 5.0 g/dL    Bilirubin Total 0.5 0.0 - 1.0 mg/dL    GFR Estimate >90 >60 mL/min/1.73m2      Comment:      As of July 11, 2021, eGFR is calculated by the CKD-EPI creatinine equation, without race adjustment. eGFR can be influenced by muscle mass, exercise, and diet. The reported eGFR is an estimation only and is only applicable if the renal function is stable.   PSA, screen   Result Value Ref Range     Prostate Specific Antigen Screen 1.03 0.00 - 3.50 ug/L    Narrative    Assay Method is Abbott Prostate-Specific Antigen (PSA)  Standard-WHO 1st International (90:10)   Vitamin B12   Result Value Ref Range    Vitamin B12 709 213 - 816 pg/mL   Vitamin D Deficiency   Result Value Ref Range    Vitamin D, Total (25-Hydroxy) 27 (L) 30 - 80 ug/L    Narrative    Deficiency <10.0 ug/L  Insufficiency 10.0-29.9 ug/L  Sufficiency 30.0-80.0 ug/L  Toxicity (possible) >100.0 ug/L    Protein  random urine with Creat Ratio   Result Value Ref Range    Total Protein Urine mg/dL <7.0 mg/dL    Total Protein UR MG/MG CR        Comment:      Unable to calculate:  Urine creatinine or protein value below detectable level    Creatinine Urine mg/dL 39 mg/dL    Narrative    The reference range has not been established for total protein, creatinine and the protein mg/mg creatinine  in random urine samples. The result should be integrated into the clinical context for interpretation.     The reference range has not been established for creatinine and the protein mg/mg creatinine in random urine samples. The result should be integrated into the clinical context for interpretation.     CBC with platelets and differential   Result Value Ref Range    WBC Count 9.2 4.0 - 11.0 10e3/uL    RBC Count 5.11 4.40 - 5.90 10e6/uL    Hemoglobin 13.9 13.3 - 17.7 g/dL    Hematocrit 42.7 40.0 - 53.0 %    MCV 84 78 - 100 fL    MCH 27.2 26.5 - 33.0 pg    MCHC 32.6 31.5 - 36.5 g/dL    RDW 14.0 10.0 - 15.0 %    Platelet Count 210 150 - 450 10e3/uL    % Neutrophils 58 %    % Lymphocytes 32 %    % Monocytes 9 %    % Eosinophils 1 %    % Basophils 0 %    % Immature Granulocytes 0 %    Absolute Neutrophils 5.3 1.6 - 8.3 10e3/uL    Absolute Lymphocytes 2.9 0.8 - 5.3 10e3/uL    Absolute Monocytes 0.8 0.0 - 1.3 10e3/uL    Absolute Eosinophils 0.1 0.0 - 0.7 10e3/uL    Absolute Basophils 0.0 0.0 - 0.2 10e3/uL    Absolute Immature Granulocytes 0.0 <=0.0 10e3/uL       If you  have any questions or concerns, please call the clinic at the number listed above.       Sincerely,      Yudith Flaherty MD

## 2021-10-14 NOTE — PROGRESS NOTES
SUBJECTIVE:   CC: Tano Garcia is an 61 year old male who presents for preventative health visit.     Tano is a 61 y.o. male with history of type 2 diabetes, high blood pressure, kidney stones, hyperlipidemia and cholecystectomy.    No concerns or complaints today.  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  She is currently on Metformin 1000 mg twice a day and sugars at home have been in 107-115 range, no hypoglycemia.  She did have an eye exam and [facility] today and denies any diabetic retinopathy but was prescribed brinzolamide to try due to slightly increased eye pressures and will follow up again with ophthalmology in 3 weeks.  He denies symptoms of neuropathy and diabetic foot exam today is normal.    He is on losartan for hypertension and blood pressure today slightly above goal at 134/68.  He has not been checking his blood pressures at home.    She also ran out of Zocor.  Previously LDL was up to 166 prior to starting on Zocor.    {Patient has been advised of split billing requirements and indicates understanding: Yes  Healthy Habits:     Getting at least 3 servings of Calcium per day:  Yes    Bi-annual eye exam:  NO    Dental care twice a year:  Yes    Sleep apnea or symptoms of sleep apnea:  None    Diet:  Low salt, Low fat/cholesterol, Diabetic, Vegetarian/vegan and Gluten-free/reduced    Frequency of exercise:  None    Taking medications regularly:  Yes    Medication side effects:  None    PHQ-2 Total Score: 0    Additional concerns today:  No    Today's PHQ-2 Score:   PHQ-2 ( 1999 Pfizer) 10/14/2021   Q1: Little interest or pleasure in doing things 0   Q2: Feeling down, depressed or hopeless 0   PHQ-2 Score 0   Q1: Little interest or pleasure in doing things Not at all   Q2: Feeling down, depressed or hopeless Not at all   PHQ-2 Score 0       Abuse: Current or Past(Physical, Sexual or Emotional)- No  Do you feel safe in your environment?  "Yes    Have you ever done Advance Care Planning? (For example, a Health Directive, POLST, or a discussion with a medical provider or your loved ones about your wishes): No, advance care planning information given to patient to review.  Patient plans to discuss their wishes with loved ones or provider.      Social History     Tobacco Use     Smoking status: Former Smoker     Smokeless tobacco: Never Used   Substance Use Topics     Alcohol use: No     If you drink alcohol do you typically have >3 drinks per day or >7 drinks per week? No    Alcohol Use 10/14/2021   Prescreen: >3 drinks/day or >7 drinks/week? No       Last PSA:   Prostate Specific Antigen Screen   Date Value Ref Range Status   01/21/2021 1.1 0.0 - 3.5 ng/mL Final       Reviewed orders with patient. Reviewed health maintenance and updated orders accordingly - Yes  Lab work is in process    Reviewed and updated as needed this visit by clinical staff  Tobacco  Allergies  Meds              Reviewed and updated as needed this visit by Provider              Review of Systems  As above, no chest pains palpitations or shortness of breath, no abdominal pain constipation diarrhea blood in the stool.    OBJECTIVE:   /68 (BP Location: Left arm, Patient Position: Sitting, Cuff Size: Adult Small)   Pulse 74   Ht 1.803 m (5' 11\")   Wt 94.3 kg (208 lb)   SpO2 97%   BMI 29.01 kg/m      Physical Exam  General: well appearing male, alert and oriented x3  EYES: Eyelids, conjunctiva, and sclera were normal. Pupils were normal.   HEAD, EARS, NOSE, MOUTH, AND THROAT: no cervical LAD, no thyromegaly or nodules appreciated. TMs are visualized and normal, oropharynx is clear.  RESPIRATORY: respirations non labored, CTA bl, no wheezes, rales, no forced expiratory wheezing.  CARDIOVASCULAR: Heart rate and rhythm were normal. No murmurs, rubs,gallops. There was no peripheral edema. GASTROINTESTINAL: Positive bowel sounds, abdomen is soft, non tender, non distended.   "   MUSCULOSKELETAL: Muscle mass was normal for age. No joint synovitis or deformity.  LYMPHATIC: There were no enlarged nodes palpable.  SKIN/HAIR/NAILS: Skin color was normal.  No rashes.  NEUROLOGIC: The patient was alert and oriented.  Speech was normal.  There is no facial asymmetry.   PSYCHIATRIC:  Mood and affect were normal.   Foot exam: Monofilament test is negative.      ASSESSMENT/PLAN:   Tano was seen today for physical.    Diagnoses and all orders for this visit:    Annual physical exam  He is due for repeat colonoscopy which was ordered.  Discussed to monitor blood pressure at home.  Flu shot was administered to him today.  He will get Covid vaccine booster in the future.  We will check blood work today.    Type 2 diabetes mellitus without complication, without long-term current use of insulin (H)  Well-controlled on Metformin, recent A1c was 6.7, he denies any hypoglycemia, monofilament test today is negative.  [facility], he reports that there is no diabetic eye disease but was given drops for borderline eye pressures.  He is on losartan, aspirin, Zocor will be refilled  -     Protein  random urine with Creat Ratio    Hypercholesterolemia  He ran out of Zocor recently  -     simvastatin (ZOCOR) 20 MG tablet; [SIMVASTATIN (ZOCOR) 20 MG TABLET] TAKE 1 TABLET BY MOUTH EVERY DAY  -     Lipid panel reflex to direct LDL Fasting  -     Comprehensive metabolic panel    Essential hypertension  She is on losartan 50 mg a day.  Blood pressure today is slightly above goal.  Discussed to monitor at home and let me know if it is above goal range.  -     Comprehensive metabolic panel    Prostate cancer screening  -     PSA, screen    Vitamin B12 deficiency (non anemic)  He takes supplement  -     CBC with platelets and differential  -     Vitamin B12    Vitamin D deficiency  He is on a supplement  -     Vitamin D Deficiency    Colon cancer screening  -      "Adult Gastro Ref - Procedure Only; Future    Other orders  -     REVIEW OF HEALTH MAINTENANCE PROTOCOL ORDERS  -     VA RIV4 (FLUBLOK) VACCINE RECOMBINANT DNA PRSRV ANTIBIO FREE, IM      Estimated body mass index is 29.01 kg/m  as calculated from the following:    Height as of this encounter: 1.803 m (5' 11\").    Weight as of this encounter: 94.3 kg (208 lb).     He reports that he has quit smoking. He has never used smokeless tobacco.      Counseling Resources:  ATP IV Guidelines  Pooled Cohorts Equation Calculator  FRAX Risk Assessment  ICSI Preventive Guidelines  Dietary Guidelines for Americans, 2010  USDA's MyPlate  ASA Prophylaxis  Lung CA Screening    Yudith Flaherty MD  [facility]  "

## 2021-10-14 NOTE — PATIENT INSTRUCTIONS
1. Goal b/p is <130/80, let me know if at home b/p is higher.    2. Increase exercise    3. Get Pfizer vaccine booster after November 11th    4. Flu shot today    5. Ask pharmacy about getting Shingles vaccine ( 2 shots, second shot 2-6 months later).     6. Have repeat colonoscopy done

## 2021-10-15 LAB
ALBUMIN MFR UR ELPH: <7 MG/DL
ALBUMIN SERPL-MCNC: 3.9 G/DL (ref 3.5–5)
ALP SERPL-CCNC: 43 U/L (ref 45–120)
ALT SERPL W P-5'-P-CCNC: 16 U/L (ref 0–45)
ANION GAP SERPL CALCULATED.3IONS-SCNC: 10 MMOL/L (ref 5–18)
AST SERPL W P-5'-P-CCNC: 25 U/L (ref 0–40)
BILIRUB SERPL-MCNC: 0.5 MG/DL (ref 0–1)
BUN SERPL-MCNC: 8 MG/DL (ref 8–22)
CALCIUM SERPL-MCNC: 9.6 MG/DL (ref 8.5–10.5)
CHLORIDE BLD-SCNC: 99 MMOL/L (ref 98–107)
CHOLEST SERPL-MCNC: 200 MG/DL
CO2 SERPL-SCNC: 26 MMOL/L (ref 22–31)
CREAT SERPL-MCNC: 0.88 MG/DL (ref 0.7–1.3)
CREAT UR-MCNC: 39 MG/DL
FASTING STATUS PATIENT QL REPORTED: NO
GFR SERPL CREATININE-BSD FRML MDRD: >90 ML/MIN/1.73M2
GLUCOSE BLD-MCNC: 98 MG/DL (ref 70–125)
HDLC SERPL-MCNC: 56 MG/DL
LDLC SERPL CALC-MCNC: 124 MG/DL
POTASSIUM BLD-SCNC: 4 MMOL/L (ref 3.5–5)
PROT SERPL-MCNC: 7 G/DL (ref 6–8)
PROT/CREAT 24H UR: NORMAL MG/G{CREAT}
PSA SERPL-MCNC: 1.03 UG/L (ref 0–3.5)
SODIUM SERPL-SCNC: 135 MMOL/L (ref 136–145)
TRIGL SERPL-MCNC: 99 MG/DL
VIT B12 SERPL-MCNC: 709 PG/ML (ref 213–816)

## 2021-10-18 LAB — DEPRECATED CALCIDIOL+CALCIFEROL SERPL-MC: 27 UG/L (ref 30–80)

## 2021-11-29 DIAGNOSIS — E11.9 TYPE 2 DIABETES MELLITUS (H): ICD-10-CM

## 2021-12-01 NOTE — TELEPHONE ENCOUNTER
"Outpatient Medication Detail     Disp Refills Start End SEAN   blood glucose test (CONTOUR NEXT TEST STRIPS) strips 100 strip 3 2021  --   Sig - Route: Use 1 each As Directed as needed. Dispense brand per patient's insurance at pharmacy discretion. - Miscellaneous   Sent to pharmacy as: Contour Next Test Strips (blood glucose test)   E-Prescribing Status: Receipt confirmed by pharmacy (2021  4:50 PM CDT)     Refill request is >14 days - will refuse per protocol    Requested Prescriptions   Pending Prescriptions Disp Refills     blood glucose (CONTOUR NEXT TEST) test strip 100 strip 3     Si strip       Diabetic Supplies Protocol Passed - 2021  3:47 PM        Passed - Medication is active on med list        Passed - Patient is 18 years of age or older        Passed - Recent (6 mo) or future (30 days) visit within the authorizing provider's specialty     Patient had office visit in the last 6 months or has a visit in the next 30 days with authorizing provider.  See \"Patient Info\" tab in inbasket, or \"Choose Columns\" in Meds & Orders section of the refill encounter.                 Cristopher Kennedy RN 21 8:01 AM  "

## 2022-01-08 DIAGNOSIS — I10 ESSENTIAL HYPERTENSION: ICD-10-CM

## 2022-01-11 RX ORDER — LOSARTAN POTASSIUM 50 MG/1
TABLET ORAL
Qty: 90 TABLET | Refills: 3 | OUTPATIENT
Start: 2022-01-11

## 2022-02-22 DIAGNOSIS — E11.9 TYPE 2 DIABETES MELLITUS WITHOUT COMPLICATION, WITHOUT LONG-TERM CURRENT USE OF INSULIN (H): ICD-10-CM

## 2022-04-16 NOTE — TELEPHONE ENCOUNTER
CC:   Chief Complaint   Patient presents with   •  Symptoms     PT reports 2 day Hx of dysuria   • Headache     Reports 3 day Hx of \"scratchy throat\" and increasing dull HA and occasional cough.  Is concerned with Grandkids coming over tomorrow, and potentially exposing them to covid.  \"I would like a covid test\"           HPI:    ATTENDING/COLLABERATING PHYSICIAN DR. Robb Causey  Ligia is a 65 year old year old female who presents to the urgent care for evaluation of upper respiratory symptoms over the last 2-3 days. Symptoms include cough, sore throat, headache and fatigue. Patient denies  remarkable  fever and SOB.  Patient also reporting dysuria symptoms as well.  No other issues    Current Outpatient Medications   Medication Sig   • sertraline (ZOLOFT) 50 MG tablet Take 1.5-2 tablets by mouth daily.   • ALPRAZolam (XANAX) 0.5 MG tablet Take 1 tablet by mouth nightly for sleep AND 1 tablet as needed for acute anxiety   • fluticasone (FLONASE) 50 MCG/ACT nasal spray Spray 1 spray in each nostril as needed (allergic rhinitis).   • simvastatin (ZOCOR) 20 MG tablet Take 1 tablet by mouth nightly.   • omeprazole (PrilOSEC) 20 MG capsule Take 1 capsule by mouth daily.   • potassium CHLORIDE (KLOR-CON M) 20 MEQ benjamin ER tablet Take 1 tablet by mouth daily.   • hydroCHLOROthiazide (HYDRODIURIL) 25 MG tablet Take 1 tablet by mouth daily.   • enalapril (VASOTEC) 10 MG tablet Take 1 tablet by mouth daily.   • betamethasone dipropionate (DIPROSONE) 0.05 % ointment Apply topically 2 times daily. Not longer then 4 weeks.   • Ketotifen Fumarate (ZADITOR OP) Apply to eye as needed.   • Probiotic Product (PROBIOTIC DAILY PO)    • betamethasone dipropionate augmented (DIPROLENE AF) 0.05 % cream Apply topically 2 times daily.   • Aspirin (ASPIR-81 PO) Take 81 mg by mouth daily.    • nitrofurantoin, macrocrystal-monohydrate, (Macrobid) 100 MG capsule Take 1 capsule by mouth 2 times daily. Use for 7 days   • albuterol 108 (90  Question following Office Visit  When did you see your provider: yesterday  What is your question: Susan Clifton with "[employer] calling. Requesting work restrictions for this patient. Please advise please fax restriction to Fax # [phone]  Okay to leave a detailed message: Yes     Base) MCG/ACT inhaler Inhale 2 puffs into the lungs every 4 hours as needed for shortness of breath or wheezing.   • urea (CARMOL 40) 40 % cream Apply topically 2 times daily to heels.     No current facility-administered medications for this visit.       ALLERGIES:   Allergen Reactions   • Amlodipine Other (See Comments)     Flushing.   • Ciprofloxacin SWELLING     swelling of tongue   • Darvon [Propoxyphene] Palpitations and Other (See Comments)   • Dicloxacillin GI UPSET   • Lexapro ANXIETY     [place] \"edgy, crawling out of skin\"   • Lisinopril Cough   • Losartan SWELLING     Bilateral hands and feet   • Sulfa Antibiotics NAUSEA and Other (See Comments)       Past Medical History:   Diagnosis Date   • Allergic rhinitis    • Asthma    • Blepharitis with rosacea    • Dysthymia    • ALONZO (generalized anxiety disorder)    • GERD (gastroesophageal reflux disease)    • High cholesterol    • History of Clostridium difficile colitis    • Hypertension    • IFG (impaired fasting glucose)    • Lichen planus    • Liver damage, alcoholic (CMS/[facility])    • Morbid obesity with BMI of 40.0-44.9, adult (CMS/[facility])    • OCD (obsessive compulsive disorder)    • RIVERA (obstructive sleep apnea)     does use CPAP   • RIVERA (obstructive sleep apnea) 7/12/2018    Currently not treated wanting re eval   • PTSD (post-traumatic stress disorder)    • Sensorineural hearing loss (SNHL) of both ears    • Wears partial dentures        REVIEW OF SYSTEMS    See history of present illness otherwise 10 point review of systems is negative      PHYSICAL EXAM    Vital Signs:    Vitals:    04/16/22 0721   BP: 130/67   BP Location: LFA - Left forearm   Patient Position: Sitting   Cuff Size: Regular   Pulse: 66   Resp: 14   Temp: 97 °F (36.1 °C)   TempSrc: Temporal   SpO2: 95%   Weight: 115.7 kg (255 lb 1.2 oz)   Height: 5' 6.5\" (1.689 m)     Constitutional:  No acute distress. Acting and behaving appropriately.   ENT: TMs pearly gray. External auditory canals  clear. Nares patent, no obvious rhinorrhea. Posterior nasopharynx and oropharynx are clear without exudates. Uvula midline. Oral mucosa pink moist intact without lesions.    Neck: Supple, nontender. No lymphadenopathy.    Respiratory:  Lungs clear to auscultation bilaterally equal rise and fall. Respirations nonlabored  Cardiovascular:  S1, S2, regular rate and rhythm. No murmurs, rubs, or gallops.      Walk In on 04/16/2022   Component Date Value Ref Range Status   • COLOR, URINALYSIS 04/16/2022 Yellow   Final   • APPEARANCE, URINALYSIS 04/16/2022 Clear   Final   • GLUCOSE, URINALYSIS 04/16/2022 Negative  Negative mg/dL Final   • BILIRUBIN, URINALYSIS 04/16/2022 Negative  Negative Final   • KETONES, URINALYSIS 04/16/2022 Negative  Negative mg/dL Final   • SPECIFIC GRAVITY, URINALYSIS 04/16/2022 1.020  1.005 - 1.030 Final   • OCCULT BLOOD, URINALYSIS 04/16/2022 Trace (A) Negative Final   • PH, URINALYSIS 04/16/2022 7.5 (A) 5.0 - 7.0 Final   • PROTEIN, URINALYSIS 04/16/2022 Negative  Negative mg/dL Final   • UROBILINOGEN, URINALYSIS 04/16/2022 0.2  0.2, 1.0 mg/dL Final   • NITRITE, URINALYSIS 04/16/2022 Negative  Negative Final   • LEUKOCYTE ESTERASE, URINALYSIS 04/16/2022 Small (A) Negative Final   • SQUAMOUS EPITHELIAL, URINALYSIS 04/16/2022 6 to 10 (A) None Seen, 1 to 5 /hpf Final   • ERYTHROCYTES, URINALYSIS 04/16/2022 1 to 2  None Seen, 1 to 2 /hpf Final   • LEUKOCYTES, URINALYSIS 04/16/2022 6 to 10 (A) None Seen, 1 to 5 /hpf Final   • BACTERIA, URINALYSIS 04/16/2022 Few (A) None Seen /hpf Final   • HYALINE CASTS, URINALYSIS 04/16/2022 None Seen  None Seen, 1 to 5 /lpf Final   • Rapid SARS-COV-2 by PCR 04/16/2022 Not Detected  Not Detected / Detected / Presumptive Positive / Inhibitors present Final   • Influenza A by PCR 04/16/2022 Not Detected  Not Detected Final   • Influenza B by PCR 04/16/2022 Not Detected  Not Detected Final   • Isolation Guidelines 04/16/2022    Final   • Procedural Comment 04/16/2022     Final        ASSESSMENT & PLAN    1. Acute upper respiratory infection    2. Dysuria      Will start patient on Macrobid b.i.d. for treatment of acute UTI.  Rapid COVID/influenza testing is negative.  Other symptoms are consistent with viral upper respiratory infection.  Recommending supportive care if any new or worsening issues contact clinic or seek medical attention.  All questions and concerns addressed in clinic today patient understood instructions clearly and patient was discharged in stable condition.    Orders Placed This Encounter   • Urinalysis With Microscopy & Culture If Indicated   • SARS-COV-2/INFLUENZA BY PCR   • Urine, Bacterial Culture   • nitrofurantoin, macrocrystal-monohydrate, (Macrobid) 100 MG capsule     See Patient Instruction section  No follow-ups on file.

## 2022-04-28 DIAGNOSIS — E11.9 TYPE 2 DIABETES MELLITUS (H): ICD-10-CM

## 2022-04-28 DIAGNOSIS — E11.9 TYPE 2 DIABETES MELLITUS WITHOUT COMPLICATION, WITHOUT LONG-TERM CURRENT USE OF INSULIN (H): ICD-10-CM

## 2022-04-28 DIAGNOSIS — E53.8 VITAMIN B12 DEFICIENCY (NON ANEMIC): ICD-10-CM

## 2022-04-28 DIAGNOSIS — E55.9 VITAMIN D DEFICIENCY: ICD-10-CM

## 2022-04-28 DIAGNOSIS — E78.00 HYPERCHOLESTEROLEMIA: ICD-10-CM

## 2022-04-28 DIAGNOSIS — I10 ESSENTIAL HYPERTENSION: ICD-10-CM

## 2022-04-28 RX ORDER — LOSARTAN POTASSIUM 50 MG/1
50 TABLET ORAL DAILY
Qty: 90 TABLET | Refills: 3 | Status: SHIPPED | OUTPATIENT
Start: 2022-04-28 | End: 2023-04-07

## 2022-04-28 RX ORDER — SIMVASTATIN 20 MG
TABLET ORAL
Qty: 90 TABLET | Refills: 3 | Status: SHIPPED | OUTPATIENT
Start: 2022-04-28 | End: 2023-05-10

## 2022-04-28 NOTE — TELEPHONE ENCOUNTER
Patient came in stated he needed medication refill on all medications. Asked patient which medication but he only stated blood thinner and blood pressure. Please call patient if any questions. Thanks.

## 2022-05-01 RX ORDER — ACETAMINOPHEN 160 MG
TABLET,DISINTEGRATING ORAL
Qty: 90 CAPSULE | Refills: 1 | Status: SHIPPED | OUTPATIENT
Start: 2022-05-01 | End: 2023-06-07

## 2022-05-01 RX ORDER — LANOLIN ALCOHOL/MO/W.PET/CERES
CREAM (GRAM) TOPICAL
Qty: 100 TABLET | Refills: 3 | OUTPATIENT
Start: 2022-05-01

## 2022-05-01 NOTE — TELEPHONE ENCOUNTER
"Last Written Prescription Date:  4/28/21  Last Fill Quantity: 90,  # refills: 3   Last office visit provider:  10/14/21     Requested Prescriptions   Pending Prescriptions Disp Refills     Cholecalciferol (VITAMIN D3) 50 MCG (2000 UT) CAPS [Pharmacy Med Name: VITAMIN D3 2,000UNIT CAPSULES] 90 capsule 3     Sig: TAKE 1 CAPSULE BY MOUTH DAILY       Vitamin Supplements (Adult) Protocol Passed - 4/28/2022  9:53 AM        Passed - High dose Vitamin D not ordered        Passed - Recent (12 mo) or future (30 days) visit within the authorizing provider's specialty     Patient has had an office visit with the authorizing provider or a provider within the authorizing providers department within the previous 12 mos or has a future within next 30 days. See \"Patient Info\" tab in inbasket, or \"Choose Columns\" in Meds & Orders section of the refill encounter.              Passed - Medication is active on med list         Refused Prescriptions Disp Refills     cyanocobalamin (VITAMIN B-12) 1000 MCG tablet [Pharmacy Med Name: VITAMIN B-12 1000MCG TABLETS N/B] 100 tablet 3     Sig: TAKE 1 TABLET(1000 MCG) BY MOUTH DAILY       Vitamin Supplements (Adult) Protocol Passed - 4/28/2022  9:53 AM        Passed - High dose Vitamin D not ordered        Passed - Recent (12 mo) or future (30 days) visit within the authorizing provider's specialty     Patient has had an office visit with the authorizing provider or a provider within the authorizing providers department within the previous 12 mos or has a future within next 30 days. See \"Patient Info\" tab in inbasket, or \"Choose Columns\" in Meds & Orders section of the refill encounter.              Passed - Medication is active on med list           metFORMIN (GLUCOPHAGE) 1000 MG tablet [Pharmacy Med Name: METFORMIN 1000MG TABLETS] 180 tablet 3     Sig: TAKE 1 TABLET BY MOUTH TWICE DAILY       Biguanide Agents Failed - 4/28/2022  9:53 AM        Failed - Patient has documented A1c within the " "specified period of time.     If HgbA1C is 8 or greater, it needs to be on file within the past 3 months.  If less than 8, must be on file within the past 6 months.     Recent Labs   Lab Test 10/13/21  1348   A1C 6.7*             Failed - Recent (6 mo) or future (30 days) visit within the authorizing provider's specialty     Patient had office visit in the last 6 months or has a visit in the next 30 days with authorizing provider or within the authorizing provider's specialty.  See \"Patient Info\" tab in inbasket, or \"Choose Columns\" in Meds & Orders section of the refill encounter.            Passed - Patient is age 10 or older        Passed - Patient's CR is NOT>1.4 OR Patient's EGFR is NOT<45 within past 12 mos.     Recent Labs   Lab Test 10/14/21  1658 01/21/21  1453   GFRESTIMATED >90 >60   GFRESTBLACK  --  >60       Recent Labs   Lab Test 10/14/21  1658   CR 0.88             Passed - Patient does NOT have a diagnosis of CHF.        Passed - Medication is active on med list             Jian Chopra RN 05/01/22 12:50 PM    "

## 2022-05-05 ENCOUNTER — TRANSFERRED RECORDS (OUTPATIENT)
Dept: HEALTH INFORMATION MANAGEMENT | Facility: CLINIC | Age: 62
End: 2022-05-05
Payer: COMMERCIAL

## 2022-08-11 ENCOUNTER — TELEPHONE (OUTPATIENT)
Dept: INTERNAL MEDICINE | Facility: CLINIC | Age: 62
End: 2022-08-11

## 2022-08-11 DIAGNOSIS — E11.9 TYPE 2 DIABETES MELLITUS (H): ICD-10-CM

## 2022-08-11 DIAGNOSIS — E11.9 TYPE 2 DIABETES MELLITUS WITHOUT COMPLICATION, WITHOUT LONG-TERM CURRENT USE OF INSULIN (H): Primary | ICD-10-CM

## 2022-08-11 RX ORDER — BLOOD SUGAR DIAGNOSTIC
STRIP MISCELLANEOUS
Qty: 100 STRIP | OUTPATIENT
Start: 2022-08-11

## 2022-08-11 NOTE — TELEPHONE ENCOUNTER
General Call      Reason for Call:  Pharmacy calling to clarify:  One touch ultra is not covered by patient insurance    Need a new rx for:  Lancettes and glucose meter (Accu-chek guide) this is covered by insurance        What are your questions or concerns:  n/a    Date of last appointment with provider: n/a    Okay to leave a detailed message?: Yes at Other phone number:  [phone]

## 2022-09-08 ENCOUNTER — TRANSFERRED RECORDS (OUTPATIENT)
Dept: HEALTH INFORMATION MANAGEMENT | Facility: CLINIC | Age: 62
End: 2022-09-08

## 2022-09-08 LAB
RETINOPATHY: NEGATIVE
RETINOPATHY: NEGATIVE

## 2022-09-30 ENCOUNTER — TELEPHONE (OUTPATIENT)
Dept: INTERNAL MEDICINE | Facility: CLINIC | Age: 62
End: 2022-09-30

## 2022-10-01 NOTE — TELEPHONE ENCOUNTER
Pt is scheduled with me for a physical too early (last PE was 10.14.21).  I can see him for follow up st current scheduled day or he can reschedule PE for after Oct 14th.

## 2022-10-03 ENCOUNTER — OFFICE VISIT (OUTPATIENT)
Dept: INTERNAL MEDICINE | Facility: CLINIC | Age: 62
End: 2022-10-03
Payer: COMMERCIAL

## 2022-10-03 ENCOUNTER — TELEPHONE (OUTPATIENT)
Dept: INTERNAL MEDICINE | Facility: CLINIC | Age: 62
End: 2022-10-03

## 2022-10-03 VITALS
RESPIRATION RATE: 16 BRPM | DIASTOLIC BLOOD PRESSURE: 61 MMHG | HEIGHT: 73 IN | HEART RATE: 59 BPM | WEIGHT: 218 LBS | TEMPERATURE: 98.3 F | BODY MASS INDEX: 28.89 KG/M2 | SYSTOLIC BLOOD PRESSURE: 128 MMHG | OXYGEN SATURATION: 100 %

## 2022-10-03 DIAGNOSIS — E11.9 TYPE 2 DIABETES MELLITUS WITHOUT COMPLICATION, WITHOUT LONG-TERM CURRENT USE OF INSULIN (H): ICD-10-CM

## 2022-10-03 DIAGNOSIS — I10 ESSENTIAL HYPERTENSION: ICD-10-CM

## 2022-10-03 DIAGNOSIS — Z00.00 ANNUAL PHYSICAL EXAM: Primary | ICD-10-CM

## 2022-10-03 DIAGNOSIS — Z12.5 SCREENING FOR PROSTATE CANCER: ICD-10-CM

## 2022-10-03 DIAGNOSIS — Z13.220 SCREENING FOR HYPERLIPIDEMIA: ICD-10-CM

## 2022-10-03 DIAGNOSIS — Z23 HIGH PRIORITY FOR 2019-NCOV VACCINE: ICD-10-CM

## 2022-10-03 LAB
ALBUMIN SERPL BCG-MCNC: 4.3 G/DL (ref 3.5–5.2)
ALP SERPL-CCNC: 34 U/L (ref 40–129)
ALT SERPL W P-5'-P-CCNC: 21 U/L (ref 10–50)
ANION GAP SERPL CALCULATED.3IONS-SCNC: 8 MMOL/L (ref 7–15)
AST SERPL W P-5'-P-CCNC: 28 U/L (ref 10–50)
BASOPHILS # BLD AUTO: 0 10E3/UL (ref 0–0.2)
BASOPHILS NFR BLD AUTO: 0 %
BILIRUB SERPL-MCNC: 0.4 MG/DL
BUN SERPL-MCNC: 10.2 MG/DL (ref 8–23)
CALCIUM SERPL-MCNC: 9.2 MG/DL (ref 8.8–10.2)
CHLORIDE SERPL-SCNC: 100 MMOL/L (ref 98–107)
CHOLEST SERPL-MCNC: 150 MG/DL
CREAT SERPL-MCNC: 0.72 MG/DL (ref 0.67–1.17)
CREAT UR-MCNC: 86.7 MG/DL
DEPRECATED HCO3 PLAS-SCNC: 26 MMOL/L (ref 22–29)
EOSINOPHIL # BLD AUTO: 0.2 10E3/UL (ref 0–0.7)
EOSINOPHIL NFR BLD AUTO: 3 %
ERYTHROCYTE [DISTWIDTH] IN BLOOD BY AUTOMATED COUNT: 13.7 % (ref 10–15)
GFR SERPL CREATININE-BSD FRML MDRD: >90 ML/MIN/1.73M2
GLUCOSE SERPL-MCNC: 124 MG/DL (ref 70–99)
HBA1C MFR BLD: 7.5 % (ref 0–5.6)
HCT VFR BLD AUTO: 42.2 % (ref 40–53)
HDLC SERPL-MCNC: 43 MG/DL
HGB BLD-MCNC: 13.8 G/DL (ref 13.3–17.7)
IMM GRANULOCYTES # BLD: 0 10E3/UL
IMM GRANULOCYTES NFR BLD: 0 %
LDLC SERPL CALC-MCNC: 85 MG/DL
LYMPHOCYTES # BLD AUTO: 2.6 10E3/UL (ref 0.8–5.3)
LYMPHOCYTES NFR BLD AUTO: 36 %
MCH RBC QN AUTO: 27.1 PG (ref 26.5–33)
MCHC RBC AUTO-ENTMCNC: 32.7 G/DL (ref 31.5–36.5)
MCV RBC AUTO: 83 FL (ref 78–100)
MICROALBUMIN UR-MCNC: <12 MG/L
MICROALBUMIN/CREAT UR: NORMAL MG/G{CREAT}
MONOCYTES # BLD AUTO: 0.6 10E3/UL (ref 0–1.3)
MONOCYTES NFR BLD AUTO: 9 %
NEUTROPHILS # BLD AUTO: 3.7 10E3/UL (ref 1.6–8.3)
NEUTROPHILS NFR BLD AUTO: 52 %
NONHDLC SERPL-MCNC: 107 MG/DL
PLATELET # BLD AUTO: 178 10E3/UL (ref 150–450)
POTASSIUM SERPL-SCNC: 3.8 MMOL/L (ref 3.4–5.3)
PROT SERPL-MCNC: 7 G/DL (ref 6.4–8.3)
PSA SERPL-MCNC: 1.04 NG/ML (ref 0–4.5)
RBC # BLD AUTO: 5.1 10E6/UL (ref 4.4–5.9)
SODIUM SERPL-SCNC: 134 MMOL/L (ref 136–145)
TRIGL SERPL-MCNC: 112 MG/DL
TSH SERPL DL<=0.005 MIU/L-ACNC: 0.94 UIU/ML (ref 0.3–4.2)
WBC # BLD AUTO: 7.3 10E3/UL (ref 4–11)

## 2022-10-03 PROCEDURE — 0124A COVID-19,PF,PFIZER BOOSTER BIVALENT: CPT | Performed by: INTERNAL MEDICINE

## 2022-10-03 PROCEDURE — 83036 HEMOGLOBIN GLYCOSYLATED A1C: CPT | Performed by: INTERNAL MEDICINE

## 2022-10-03 PROCEDURE — 91312 COVID-19,PF,PFIZER BOOSTER BIVALENT: CPT | Performed by: INTERNAL MEDICINE

## 2022-10-03 PROCEDURE — 36415 COLL VENOUS BLD VENIPUNCTURE: CPT | Performed by: INTERNAL MEDICINE

## 2022-10-03 PROCEDURE — 90682 RIV4 VACC RECOMBINANT DNA IM: CPT | Performed by: INTERNAL MEDICINE

## 2022-10-03 PROCEDURE — 80050 GENERAL HEALTH PANEL: CPT | Performed by: INTERNAL MEDICINE

## 2022-10-03 PROCEDURE — 80061 LIPID PANEL: CPT | Performed by: INTERNAL MEDICINE

## 2022-10-03 PROCEDURE — 99214 OFFICE O/P EST MOD 30 MIN: CPT | Mod: 25 | Performed by: INTERNAL MEDICINE

## 2022-10-03 PROCEDURE — 90471 IMMUNIZATION ADMIN: CPT | Performed by: INTERNAL MEDICINE

## 2022-10-03 PROCEDURE — G0103 PSA SCREENING: HCPCS | Performed by: INTERNAL MEDICINE

## 2022-10-03 PROCEDURE — 82043 UR ALBUMIN QUANTITATIVE: CPT | Performed by: INTERNAL MEDICINE

## 2022-10-03 ASSESSMENT — ENCOUNTER SYMPTOMS
DIZZINESS: 0
ARTHRALGIAS: 0
HEADACHES: 0
DYSURIA: 0
HEMATOCHEZIA: 0
DIARRHEA: 0
FREQUENCY: 0
COUGH: 0
JOINT SWELLING: 0
NAUSEA: 0
CHILLS: 0
PALPITATIONS: 0
PARESTHESIAS: 0
HEMATURIA: 0
FEVER: 0
SHORTNESS OF BREATH: 0
HEARTBURN: 0
CONSTIPATION: 0
SORE THROAT: 0
EYE PAIN: 0
ABDOMINAL PAIN: 0
MYALGIAS: 0
WEAKNESS: 0
NERVOUS/ANXIOUS: 0

## 2022-10-03 NOTE — LETTER
October 4, 2022      Tano Garcia  [address]  [place] [address]        Dear ,    We are writing to inform you of your test results.    Tano    Sugars/A1C is slightly worse (goal A1C <7). This is most likely due to weight gain. Please try to loose 10 labs. Let me know if you would like to see a nutritionist to help with weight loss. Additionally, if you do not think you'll be able to loose weight in 6 months, we can add additional diabetic medication to Metformin.    Kidney, liver, thyroid functions, red cell count and prostate test are good.  Cholesterol is good.    Resulted Orders   HEMOGLOBIN A1C   Result Value Ref Range    Hemoglobin A1C 7.5 (H) 0.0 - 5.6 %      Comment:      Normal <5.7%   Prediabetes 5.7-6.4%    Diabetes 6.5% or higher     Note: Adopted from ADA consensus guidelines.   Lipid panel reflex to direct LDL Non-fasting   Result Value Ref Range    Cholesterol 150 <200 mg/dL    Triglycerides 112 <150 mg/dL    Direct Measure HDL 43 >=40 mg/dL    LDL Cholesterol Calculated 85 <=100 mg/dL    Non HDL Cholesterol 107 <130 mg/dL    Narrative    Cholesterol  Desirable:  <200 mg/dL    Triglycerides  Normal:  Less than 150 mg/dL  Borderline High:  150-199 mg/dL  High:  200-499 mg/dL  Very High:  Greater than or equal to 500 mg/dL    Direct Measure HDL  Female:  Greater than or equal to 50 mg/dL   Male:  Greater than or equal to 40 mg/dL    LDL Cholesterol  Desirable:  <100mg/dL  Above Desirable:  100-129 mg/dL   Borderline High:  130-159 mg/dL   High:  160-189 mg/dL   Very High:  >= 190 mg/dL    Non HDL Cholesterol  Desirable:  130 mg/dL  Above Desirable:  130-159 mg/dL  Borderline High:  160-189 mg/dL  High:  190-219 mg/dL  Very High:  Greater than or equal to 220 mg/dL   Albumin Random Urine Quantitative with Creat Ratio   Result Value Ref Range    Albumin Urine mg/L <12.0 mg/L      Comment:      The reference ranges have not been established in urine  albumin. The results should be integrated into the clinical context for interpretation.    Albumin Urine mg/g Cr        Comment:      Unable to calculate, urine albumin and/or urine creatinine is outside detectable limits.  Microalbuminuria is defined as an albumin:creatinine ratio of 17 to 299 for males and 25 to 299 for females. A ratio of albumin:creatinine of 300 or higher is indicative of overt proteinuria.  Due to biologic variability, positive results should be confirmed by a second, first-morning random or 24-hour timed urine specimen. If there is discrepancy, a third specimen is recommended. When 2 out of 3 results are in the microalbuminuria range, this is evidence for incipient nephropathy and warrants increased efforts at glucose control, blood pressure control, and institution of therapy with an angiotensin-converting-enzyme (ACE) inhibitor (if the patient can tolerate it).      Creatinine Urine mg/dL 86.7 mg/dL      Comment:      The reference ranges have not been established in urine creatinine. The results should be integrated into the clinical context for interpretation.   Comprehensive metabolic panel   Result Value Ref Range    Sodium 134 (L) 136 - 145 mmol/L    Potassium 3.8 3.4 - 5.3 mmol/L    Chloride 100 98 - 107 mmol/L    Carbon Dioxide (CO2) 26 22 - 29 mmol/L    Anion Gap 8 7 - 15 mmol/L    Urea Nitrogen 10.2 8.0 - 23.0 mg/dL    Creatinine 0.72 0.67 - 1.17 mg/dL    Calcium 9.2 8.8 - 10.2 mg/dL    Glucose 124 (H) 70 - 99 mg/dL    Alkaline Phosphatase 34 (L) 40 - 129 U/L    AST 28 10 - 50 U/L    ALT 21 10 - 50 U/L    Protein Total 7.0 6.4 - 8.3 g/dL    Albumin 4.3 3.5 - 5.2 g/dL    Bilirubin Total 0.4 <=1.2 mg/dL    GFR Estimate >90 >60 mL/min/1.73m2      Comment:      Effective December 21, 2021 eGFRcr in adults is calculated using the 2021 CKD-EPI creatinine equation which includes age and gender (Gagan et al., NEJM, DOI: 10.1056/BCCHtj8684552)   TSH with free T4 reflex   Result Value Ref  Range    TSH 0.94 0.30 - 4.20 uIU/mL   PSA, screen   Result Value Ref Range    Prostate Specific Antigen Screen 1.04 0.00 - 4.50 ng/mL    Narrative    This result is obtained using the Roche Elecsys total PSA method on the guille e801 immunoassay analyzer. Results obtained with different assay methods or kits cannot be used interchangeably.   CBC with platelets and differential   Result Value Ref Range    WBC Count 7.3 4.0 - 11.0 10e3/uL    RBC Count 5.10 4.40 - 5.90 10e6/uL    Hemoglobin 13.8 13.3 - 17.7 g/dL    Hematocrit 42.2 40.0 - 53.0 %    MCV 83 78 - 100 fL    MCH 27.1 26.5 - 33.0 pg    MCHC 32.7 31.5 - 36.5 g/dL    RDW 13.7 10.0 - 15.0 %    Platelet Count 178 150 - 450 10e3/uL    % Neutrophils 52 %    % Lymphocytes 36 %    % Monocytes 9 %    % Eosinophils 3 %    % Basophils 0 %    % Immature Granulocytes 0 %    Absolute Neutrophils 3.7 1.6 - 8.3 10e3/uL    Absolute Lymphocytes 2.6 0.8 - 5.3 10e3/uL    Absolute Monocytes 0.6 0.0 - 1.3 10e3/uL    Absolute Eosinophils 0.2 0.0 - 0.7 10e3/uL    Absolute Basophils 0.0 0.0 - 0.2 10e3/uL    Absolute Immature Granulocytes 0.0 <=0.4 10e3/uL       If you have any questions or concerns, please call the clinic at the number listed above.       Sincerely,      Yudith Flaherty MD

## 2022-10-03 NOTE — TELEPHONE ENCOUNTER
Please obtain copy of diabetic eye exam from [facility] in [place] and scanned to [facility].

## 2022-10-03 NOTE — PATIENT INSTRUCTIONS
Will check labs today    2. Covid booster and flu shot today    3. Try loose 10 lbs (increase physical activity).    4. Down the road will need a second pneumonia shot : pneumococcal 13 vaccine, you can get it at the pharmacy or next time when I see you.

## 2022-10-03 NOTE — PROGRESS NOTES
SUBJECTIVE:   CC: Tano is an 62 year old who presents for preventative health visit.       Tano is a 62 y.o. male with history of type 2 diabetes, high blood pressure, kidney stones, hyperlipidemia and cholecystectomy.  He is currently here for preventative care.  Of note his last physical was less than a year ago.    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    He denies any acute issues.  He has history of diabetes is on metformin 1000 mg twice a day. Unfortunately he did gain 10 pounds since last year.  He does go to [facility] in [place] and has been up-to-date on eye exam, denies any diabetic retinopathy.  Sugars at home are 100-1 30, denies any hypoglycemia.    He used to smoke half a pack a day but quit over 15 years ago.    Currently signed up to [facility] gym and plans to start exercise.  Denies any exertional chest pains or shortness of breath.    He is currently .  In the past family caused a lot of tension and currently he is content.  He is in contact with his grown children who are currently at college.    Denies any history of passing out, dizziness, lightheadedness or seizure disorder.    Healthy Habits:     Getting at least 3 servings of Calcium per day:  Yes    Bi-annual eye exam:  Yes    Dental care twice a year:  Yes    Sleep apnea or symptoms of sleep apnea:  None    Diet:  Diabetic, Vegetarian/vegan and Breakfast skipped    Frequency of exercise:  1 day/week    Duration of exercise:  15-30 minutes    Taking medications regularly:  Yes    Medication side effects:  Not applicable    PHQ-2 Total Score: 0    Additional concerns today:  No    Today's PHQ-2 Score:   PHQ-2 ( 1999 Pfizer) 10/3/2022   Q1: Little interest or pleasure in doing things 0   Q2: Feeling down, depressed or hopeless 0   PHQ-2 Score 0   PHQ-2 Total Score (12-17 Years)- Positive if 3 or more points; Administer PHQ-A if positive -   Q1: Little  "interest or pleasure in doing things Not at all   Q2: Feeling down, depressed or hopeless Not at all   PHQ-2 Score 0       Abuse: Current or Past(Physical, Sexual or Emotional)- No  Do you feel safe in your environment? Yes        Social History     Tobacco Use     Smoking status: Former Smoker     Smokeless tobacco: Never Used   Substance Use Topics     Alcohol use: No         Alcohol Use 10/3/2022   Prescreen: >3 drinks/day or >7 drinks/week? No   Prescreen: >3 drinks/day or >7 drinks/week? -       Last PSA:   Prostate Specific Antigen Screen   Date Value Ref Range Status   10/14/2021 1.03 0.00 - 3.50 ug/L Final     Reviewed and updated as needed this visit by clinical staff   Tobacco   Meds                Reviewed and updated as needed this visit by Provider                       Review of Systems   Constitutional: Negative for chills and fever.   HENT: Negative for congestion, ear pain, hearing loss and sore throat.    Eyes: Negative for pain and visual disturbance.   Respiratory: Negative for cough and shortness of breath.    Cardiovascular: Negative for chest pain, palpitations and peripheral edema.   Gastrointestinal: Negative for abdominal pain, constipation, diarrhea, heartburn, hematochezia and nausea.   Genitourinary: Positive for impotence. Negative for dysuria, frequency, genital sores, hematuria, penile discharge and urgency.   Musculoskeletal: Negative for arthralgias, joint swelling and myalgias.   Skin: Negative for rash.   Neurological: Negative for dizziness, weakness, headaches and paresthesias.   Psychiatric/Behavioral: Negative for mood changes. The patient is not nervous/anxious.        OBJECTIVE:   /61 (BP Location: Left arm, Patient Position: Sitting, Cuff Size: Adult Large)   Pulse 59   Temp 98.3  F (36.8  C) (Oral)   Resp 16   Ht 1.854 m (6' 1\")   Wt 98.9 kg (218 lb)   SpO2 100%   BMI 28.76 kg/m      Physical Exam  General: well appearing male, alert and oriented x3  EYES: " Eyelids, conjunctiva, and sclera were normal. Pupils were normal.   HEAD, EARS, NOSE, MOUTH, AND THROAT: no cervical LAD, no thyromegaly or nodules appreciated. TMs are visualized and normal, oropharynx is clear.  RESPIRATORY: respirations non labored, CTA bl, no wheezes, rales, no forced expiratory wheezing.  CARDIOVASCULAR: Heart rate and rhythm were normal. No murmurs, rubs,gallops. There was no peripheral edema.   GASTROINTESTINAL: Positive bowel sounds, abdomen is soft, non tender, non distended.     MUSCULOSKELETAL: Muscle mass was normal for age. No joint synovitis or deformity.  LYMPHATIC: There were no enlarged nodes palpable.  SKIN/HAIR/NAILS: Skin color was normal.  No rashes.  NEUROLOGIC: The patient was alert and oriented.  Speech was normal.  There is no facial asymmetry.   PSYCHIATRIC:  Mood and affect were normal.   Prostate exam: Normal, no pain, no nodules.  Monofilament test: Normal    ASSESSMENT/PLAN:   Tano was seen today for recheck medication, physical and imm/inj.    Diagnoses and all orders for this visit:    Annual physical exam  This visit had of a totally 1 year from history of previous physicals so unable to bill this as a physical.  All the preventative care was addressed today.  -     Lipid panel reflex to direct LDL Non-fasting  -     COVID-19,PF,PFIZER BOOSTER BIVALENT 12+Yrs  -     PSA, screen    Essential hypertension  Blood pressure is controlled on losartan  -     Lipid panel reflex to direct LDL Non-fasting  -     CBC with platelets and differential    Type 2 diabetes mellitus without complication, without long-term current use of insulin (H)  We will get records of his eye exam, monofilament test is negative, he is on aspirin and simvastatin.  Sugars are within normal range, will check A1c.  Recommended to lose 10 pounds that he gained over the last year.  -     HEMOGLOBIN A1C  -     Lipid panel reflex to direct LDL Non-fasting  -     Albumin Random Urine Quantitative with  "Creat Ratio  -     Comprehensive metabolic panel  -     TSH with free T4 reflex    Other orders  -     INFLUENZA QUAD, RECOMBINANT, P-FREE (RIV4) (FLUBLOK) AGE 50-64 [MNR489]        Estimated body mass index is 28.76 kg/m  as calculated from the following:    Height as of this encounter: 1.854 m (6' 1\").    Weight as of this encounter: 98.9 kg (218 lb).       He reports that he has quit smoking. He has never used smokeless tobacco.      Yudith Flaherty MD  [facility]  "

## 2022-10-07 ENCOUNTER — TELEPHONE (OUTPATIENT)
Dept: INTERNAL MEDICINE | Facility: CLINIC | Age: 62
End: 2022-10-07

## 2022-10-07 NOTE — TELEPHONE ENCOUNTER
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

## 2023-04-07 DIAGNOSIS — I10 ESSENTIAL HYPERTENSION: ICD-10-CM

## 2023-04-07 DIAGNOSIS — E11.9 TYPE 2 DIABETES MELLITUS (H): ICD-10-CM

## 2023-04-07 RX ORDER — LOSARTAN POTASSIUM 50 MG/1
TABLET ORAL
Qty: 90 TABLET | Refills: 3 | Status: SHIPPED | OUTPATIENT
Start: 2023-04-07 | End: 2023-11-08

## 2023-04-07 NOTE — TELEPHONE ENCOUNTER
"Routing refill request to provider for review/approval because:  Patient needs to be seen because:  Due for office visit  Early refill request for losartan     Last Written Prescription Date:  4/28/22 (losartan)  Last Fill Quantity: 90,  # refills: 3   Last office visit provider: 10/3/22     Last Written Prescription Date: 4/28/22 (metformin)  Last Fill Quantity: 180  # refills: 3   Last office visit provider: 10/3/22       Requested Prescriptions   Pending Prescriptions Disp Refills     losartan (COZAAR) 50 MG tablet [Pharmacy Med Name: LOSARTAN 50MG TABLETS] 90 tablet 3     Sig: TAKE 1 TABLET(50 MG) BY MOUTH DAILY       Angiotensin-II Receptors Passed - 4/7/2023  3:40 AM        Passed - Last blood pressure under 140/90 in past 12 months     BP Readings from Last 3 Encounters:   10/03/22 128/61   10/14/21 134/68   04/21/21 138/66                 Passed - Recent (12 mo) or future (30 days) visit within the authorizing provider's specialty     Patient has had an office visit with the authorizing provider or a provider within the authorizing providers department within the previous 12 mos or has a future within next 30 days. See \"Patient Info\" tab in inbasket, or \"Choose Columns\" in Meds & Orders section of the refill encounter.              Passed - Medication is active on med list        Passed - Patient is age 18 or older        Passed - Normal serum creatinine on file in past 12 months     Recent Labs   Lab Test 10/03/22  1234   CR 0.72       Ok to refill medication if creatinine is low          Passed - Normal serum potassium on file in past 12 months     Recent Labs   Lab Test 10/03/22  1234   POTASSIUM 3.8                       metFORMIN (GLUCOPHAGE) 1000 MG tablet [Pharmacy Med Name: METFORMIN 1000MG TABLETS] 180 tablet 3     Sig: TAKE 1 TABLET BY MOUTH TWICE DAILY       Biguanide Agents Failed - 4/7/2023  3:40 AM        Failed - Patient has documented A1c within the specified period of time.     If HgbA1C is " "8 or greater, it needs to be on file within the past 3 months.  If less than 8, must be on file within the past 6 months.     Recent Labs   Lab Test 10/03/22  1234   A1C 7.5*             Failed - Recent (6 mo) or future (30 days) visit within the authorizing provider's specialty     Patient had office visit in the last 6 months or has a visit in the next 30 days with authorizing provider or within the authorizing provider's specialty.  See \"Patient Info\" tab in inbasket, or \"Choose Columns\" in Meds & Orders section of the refill encounter.            Passed - Patient is age 10 or older        Passed - Patient's CR is NOT>1.4 OR Patient's EGFR is NOT<45 within past 12 mos.     Recent Labs   Lab Test 10/03/22  1234 10/14/21  1658 01/21/21  1453   GFRESTIMATED >90   < > >60   GFRESTBLACK  --   --  >60    < > = values in this interval not displayed.       Recent Labs   Lab Test 10/03/22  1234   CR 0.72             Passed - Patient does NOT have a diagnosis of CHF.        Passed - Medication is active on med list             Nelli Kyle, RN 04/07/23 5:42 PM  "

## 2023-05-08 DIAGNOSIS — E78.00 HYPERCHOLESTEROLEMIA: ICD-10-CM

## 2023-05-10 RX ORDER — SIMVASTATIN 20 MG
TABLET ORAL
Qty: 90 TABLET | Refills: 1 | Status: SHIPPED | OUTPATIENT
Start: 2023-05-10 | End: 2023-06-07

## 2023-05-10 NOTE — TELEPHONE ENCOUNTER
"Last Written Prescription Date:  4/28/22  Last Fill Quantity: 90,  # refills: 3   Last office visit provider:  10/3/22     Requested Prescriptions   Pending Prescriptions Disp Refills     simvastatin (ZOCOR) 20 MG tablet [Pharmacy Med Name: SIMVASTATIN 20MG TABLETS] 90 tablet 3     Sig: TAKE 1 TABLET BY MOUTH DAILY       Statins Protocol Passed - 5/10/2023  8:38 AM        Passed - LDL on file in past 12 months     Recent Labs   Lab Test 10/03/22  1234   LDL 85             Passed - No abnormal creatine kinase in past 12 months     No lab results found.             Passed - Recent (12 mo) or future (30 days) visit within the authorizing provider's specialty     Patient has had an office visit with the authorizing provider or a provider within the authorizing providers department within the previous 12 mos or has a future within next 30 days. See \"Patient Info\" tab in inbasket, or \"Choose Columns\" in Meds & Orders section of the refill encounter.              Passed - Medication is active on med list        Passed - Patient is age 18 or older             Jian Chopra RN 05/10/23 8:39 AM  "

## 2023-05-14 DIAGNOSIS — E78.00 HYPERCHOLESTEROLEMIA: ICD-10-CM

## 2023-05-14 RX ORDER — SIMVASTATIN 20 MG
TABLET ORAL
Qty: 90 TABLET | Refills: 1 | OUTPATIENT
Start: 2023-05-14

## 2023-05-14 NOTE — TELEPHONE ENCOUNTER
Medication refill request declined: should already have refills on file   Disp Refills Start End SEAN   simvastatin (ZOCOR) 20 MG tablet 90 tablet 1 5/10/2023  No   Sig: TAKE 1 TABLET BY MOUTH DAILY   Sent to pharmacy as: Simvastatin 20 MG Oral Tablet (ZOCOR)   Class: E-Prescribe   Order: [id number]   E-Prescribing Status: Receipt confirmed by pharmacy (5/10/2023  8:39 AM CDT)     Henny Bailey RN BSN 5/14/2023 12:56 PM

## 2023-06-07 ENCOUNTER — OFFICE VISIT (OUTPATIENT)
Dept: INTERNAL MEDICINE | Facility: CLINIC | Age: 63
End: 2023-06-07
Payer: COMMERCIAL

## 2023-06-07 VITALS
HEART RATE: 53 BPM | OXYGEN SATURATION: 99 % | BODY MASS INDEX: 26.64 KG/M2 | TEMPERATURE: 98.6 F | DIASTOLIC BLOOD PRESSURE: 58 MMHG | HEIGHT: 73 IN | SYSTOLIC BLOOD PRESSURE: 106 MMHG | RESPIRATION RATE: 17 BRPM | WEIGHT: 201 LBS

## 2023-06-07 DIAGNOSIS — E53.8 VITAMIN B12 DEFICIENCY (NON ANEMIC): ICD-10-CM

## 2023-06-07 DIAGNOSIS — E78.00 HYPERCHOLESTEROLEMIA: ICD-10-CM

## 2023-06-07 DIAGNOSIS — I10 ESSENTIAL HYPERTENSION: ICD-10-CM

## 2023-06-07 DIAGNOSIS — E55.9 VITAMIN D DEFICIENCY: ICD-10-CM

## 2023-06-07 DIAGNOSIS — E11.9 TYPE 2 DIABETES MELLITUS WITHOUT COMPLICATION, WITHOUT LONG-TERM CURRENT USE OF INSULIN (H): Primary | ICD-10-CM

## 2023-06-07 LAB
ANION GAP SERPL CALCULATED.3IONS-SCNC: 17 MMOL/L (ref 7–15)
BUN SERPL-MCNC: 9.1 MG/DL (ref 8–23)
CALCIUM SERPL-MCNC: 9.8 MG/DL (ref 8.8–10.2)
CHLORIDE SERPL-SCNC: 98 MMOL/L (ref 98–107)
CREAT SERPL-MCNC: 0.74 MG/DL (ref 0.67–1.17)
DEPRECATED HCO3 PLAS-SCNC: 20 MMOL/L (ref 22–29)
GFR SERPL CREATININE-BSD FRML MDRD: >90 ML/MIN/1.73M2
GLUCOSE SERPL-MCNC: 123 MG/DL (ref 70–99)
HBA1C MFR BLD: 6.7 % (ref 0–5.6)
POTASSIUM SERPL-SCNC: 3.9 MMOL/L (ref 3.4–5.3)
SODIUM SERPL-SCNC: 135 MMOL/L (ref 136–145)

## 2023-06-07 PROCEDURE — 90677 PCV20 VACCINE IM: CPT | Performed by: INTERNAL MEDICINE

## 2023-06-07 PROCEDURE — 99214 OFFICE O/P EST MOD 30 MIN: CPT | Mod: 25 | Performed by: INTERNAL MEDICINE

## 2023-06-07 PROCEDURE — 80048 BASIC METABOLIC PNL TOTAL CA: CPT | Performed by: INTERNAL MEDICINE

## 2023-06-07 PROCEDURE — 90471 IMMUNIZATION ADMIN: CPT | Performed by: INTERNAL MEDICINE

## 2023-06-07 PROCEDURE — 83036 HEMOGLOBIN GLYCOSYLATED A1C: CPT | Performed by: INTERNAL MEDICINE

## 2023-06-07 PROCEDURE — 36415 COLL VENOUS BLD VENIPUNCTURE: CPT | Performed by: INTERNAL MEDICINE

## 2023-06-07 RX ORDER — METFORMIN HCL 500 MG
500 TABLET, EXTENDED RELEASE 24 HR ORAL
Qty: 360 TABLET | Refills: 3 | Status: SHIPPED | OUTPATIENT
Start: 2023-06-07 | End: 2023-11-08

## 2023-06-07 RX ORDER — ACETAMINOPHEN 160 MG
1 TABLET,DISINTEGRATING ORAL DAILY
Qty: 90 CAPSULE | Refills: 1 | Status: SHIPPED | OUTPATIENT
Start: 2023-06-07 | End: 2023-11-08

## 2023-06-07 RX ORDER — UREA 10 %
500 LOTION (ML) TOPICAL DAILY
Qty: 90 TABLET | Refills: 3 | Status: SHIPPED | OUTPATIENT
Start: 2023-06-07 | End: 2023-11-08

## 2023-06-07 RX ORDER — SIMVASTATIN 20 MG
TABLET ORAL
Qty: 90 TABLET | Refills: 3 | Status: SHIPPED | OUTPATIENT
Start: 2023-06-07 | End: 2024-07-09

## 2023-06-07 NOTE — PATIENT INSTRUCTIONS
Switch Metformin 1000 twice a day to Metformin Extended release 500 mg  2 pills twice a day.    2. Take aspirin 81 daily with food. Restart B12 and vitamin D supplements.     3. B/p is great.    4. Labs today    5. Pneumonia shot today

## 2023-06-07 NOTE — LETTER
June 8, 2023      Tano Jansendemendelmywai  [address]  [place] [address]        Dear ,    We are writing to inform you of your test results.    Diabetes/A1C is well controlled:)  Kidney funtion is good.    Resulted Orders   HEMOGLOBIN A1C   Result Value Ref Range    Hemoglobin A1C 6.7 (H) 0.0 - 5.6 %      Comment:      Normal <5.7%   Prediabetes 5.7-6.4%    Diabetes 6.5% or higher     Note: Adopted from ADA consensus guidelines.   Basic metabolic panel  (Ca, Cl, CO2, Creat, Gluc, K, Na, BUN)   Result Value Ref Range    Sodium 135 (L) 136 - 145 mmol/L    Potassium 3.9 3.4 - 5.3 mmol/L    Chloride 98 98 - 107 mmol/L    Carbon Dioxide (CO2) 20 (L) 22 - 29 mmol/L    Anion Gap 17 (H) 7 - 15 mmol/L    Urea Nitrogen 9.1 8.0 - 23.0 mg/dL    Creatinine 0.74 0.67 - 1.17 mg/dL    Calcium 9.8 8.8 - 10.2 mg/dL    Glucose 123 (H) 70 - 99 mg/dL    GFR Estimate >90 >60 mL/min/1.73m2      Comment:      eGFR calculated using 2021 CKD-EPI equation.       If you have any questions or concerns, please call the clinic at the number listed above.       Sincerely,      Yudith Flaherty MD

## 2023-06-07 NOTE — NURSING NOTE
Prior to immunization administration, verified patients identity using patient s name and date of birth. Please see Immunization Activity for additional information.     Screening Questionnaire for Adult Immunization    Are you sick today?   No   Do you have allergies to medications, food, a vaccine component or latex?   No   Have you ever had a serious reaction after receiving a vaccination?   No   Do you have a long-term health problem with heart, lung, kidney, or metabolic disease (e.g., diabetes), asthma, a blood disorder, no spleen, complement component deficiency, a cochlear implant, or a spinal fluid leak?  Are you on long-term aspirin therapy?   No   Do you have cancer, leukemia, HIV/AIDS, or any other immune system problem?   No   Do you have a parent, brother, or sister with an immune system problem?   No   In the past 3 months, have you taken medications that affect  your immune system, such as prednisone, other steroids, or anticancer drugs; drugs for the treatment of rheumatoid arthritis, Crohn s disease, or psoriasis; or have you had radiation treatments?   No   Have you had a seizure, or a brain or other nervous system problem?   No   During the past year, have you received a transfusion of blood or blood    products, or been given immune (gamma) globulin or antiviral drug?   No   For women: Are you pregnant or is there a chance you could become       pregnant during the next month?   No   Have you received any vaccinations in the past 4 weeks?   No     Immunization questionnaire answers were all negative.      Injection of Prevnar 20 given by Amarilis Parnell RN.     Pt tolerated injection. Site was cleansed with alcohol prior to injection. No pain, burning, swelling or redness at the site of the injection. Patient instructed to remain in clinic for 15 minutes afterwards, and to report any adverse reactions  Screening performed by Amarilis Parnell RN on 6/7/2023 at 8:02  AM.

## 2023-06-07 NOTE — PROGRESS NOTES
"  Assessment & Plan     Type 2 diabetes mellitus without complication, without long-term current use of insulin (H)  Sugars at home are much better since he lost weight, will check A1c today.  Discussed to restart aspirin, due to diarrhea we will switch his metformin to extended release.  - HEMOGLOBIN A1C; Future  - aspirin (ASA) 81 MG EC tablet; Take 1 tablet (81 mg) by mouth daily  - metFORMIN (GLUCOPHAGE XR) 500 MG 24 hr tablet; Take 1 tablet (500 mg) by mouth daily (with dinner)  - blood glucose (ONETOUCH ULTRA) test strip; 1 strip by In Vitro route daily  - Basic metabolic panel  (Ca, Cl, CO2, Creat, Gluc, K, Na, BUN); Future    Hypercholesterolemia  He ran out of Zocor recently, discussed to restart it  - simvastatin (ZOCOR) 20 MG tablet; TAKE 1 TABLET BY MOUTH DAILY    Vitamin D deficiency  - Cholecalciferol (VITAMIN D3) 50 MCG (2000 UT) CAPS; Take 1 capsule by mouth daily    Vitamin B12 deficiency (non anemic)  - cyanocobalamin (VITAMIN B-12) 500 MCG tablet; Take 1 tablet (500 mcg) by mouth daily    Essential hypertension  Well-controlled on losartan.  - Basic metabolic panel  (Ca, Cl, CO2, Creat, Gluc, K, Na, BUN); Future     BMI:   Estimated body mass index is 26.52 kg/m  as calculated from the following:    Height as of this encounter: 1.854 m (6' 1\").    Weight as of this encounter: 91.2 kg (201 lb).           Yudith Flaherty MD  [facility]    Saloni Freedman is a 62 year old, presenting for the following health issues:  Physical        6/7/2023     7:03 AM   Additional Questions   Roomed by Carmel GERARDO CMA     Tano is a 62 y.o. male with history of type 2 diabetes, high blood pressure, kidney stones, hyperlipidemia and cholecystectomy.    Is currently here for diabetes follow-up.    Since I last saw him, he improved his diet and lost 18 pounds.  She feels great.  He has been checking sugars at home once a day and they are usually in 1 teens.  Blood pressure today " is excellent.  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  Occasionally has back pain but during morning stretching exercises has greatly improved with that.  Neck step for him would be to maintain his current weight loss and get on an exercise routine.      History of Present Illness       Diabetes:   He presents for follow up of diabetes.  He is checking home blood glucose one time daily. He checks blood glucose before meals.  Blood glucose is never over 200 and never under 70. When his blood glucose is low, the patient is asymptomatic for confusion, blurred vision, lethargy and reports not feeling dizzy, shaky, or weak.  He has no concerns regarding his diabetes at this time.  He is not experiencing numbness or burning in feet, excessive thirst, blurry vision, weight changes or redness, sores or blisters on feet.         He eats 4 or more servings of fruits and vegetables daily.He consumes 0 sweetened beverage(s) daily.He exercises with enough effort to increase his heart rate 9 or less minutes per day.  He exercises with enough effort to increase his heart rate 3 or less days per week.   He is taking medications regularly.       Diabetes Follow-up    How often are you checking your blood sugar? One time daily  What time of day are you checking your blood sugars (select all that apply)?  Before meals  Have you had any blood sugars above 200?  No  Have you had any blood sugars below 70?  No    What symptoms do you notice when your blood sugar is low?  None    What concerns do you have today about your diabetes? None     Do you have any of these symptoms? (Select all that apply)  No numbness or tingling in feet.  No redness, sores or blisters on feet.  No complaints of excessive thirst.  No reports of blurry vision.  No significant changes to weight.          Hyperlipidemia Follow-Up      Are you regularly taking any medication or supplement to lower your cholesterol?   Yes- Simvastain    Are you having muscle  "aches or other side effects that you think could be caused by your cholesterol lowering medication?  No    Hypertension Follow-up      Do you check your blood pressure regularly outside of the clinic? No     Are you following a low salt diet? No    Are your blood pressures ever more than 140 on the top number (systolic) OR more   than 90 on the bottom number (diastolic), for example 140/90? No    BP Readings from Last 2 Encounters:   06/07/23 106/58   10/03/22 128/61     Hemoglobin A1C (%)   Date Value   10/03/2022 7.5 (H)   10/13/2021 6.7 (H)     LDL Cholesterol Calculated (mg/dL)   Date Value   10/03/2022 85   10/14/2021 124     LDL Cholesterol Direct (mg/dl)   Date Value   12/14/2018 73   12/19/2017 108       Review of Systems   Patient complains of mild occasional diarrhea which could be a due to metformin      Objective    /58 (BP Location: Left arm, Patient Position: Sitting, Cuff Size: Adult Large)   Pulse 53   Temp 98.6  F (37  C) (Tympanic)   Resp 17   Ht 1.854 m (6' 1\")   Wt 91.2 kg (201 lb)   SpO2 99%   BMI 26.52 kg/m    Body mass index is 26.52 kg/m .  Physical Exam   General: well appearing male, alert and oriented x3  EYES: Eyelids, conjunctiva, and sclera were normal. Pupils were normal.   HEAD, EARS, NOSE, MOUTH, AND THROAT: no cervical LAD, no thyromegaly or nodules appreciated. TMs are visualized and normal, oropharynx is clear.  RESPIRATORY: respirations non labored, CTA bl, no wheezes, rales, no forced expiratory wheezing.  CARDIOVASCULAR: Heart rate and rhythm were normal. No murmurs, rubs,gallops. There was no peripheral edema. No carotid bruits.  GASTROINTESTINAL: Positive bowel sounds, abdomen is soft, non tender, non distended.     MUSCULOSKELETAL: Muscle mass was normal for age. No joint synovitis or deformity.  LYMPHATIC: There were no enlarged nodes palpable.  SKIN/HAIR/NAILS: Skin color was normal.  No rashes.  NEUROLOGIC: The patient was alert and oriented.  Speech was " normal.  There is no facial asymmetry.   PSYCHIATRIC:  Mood and affect were normal.

## 2023-09-14 ENCOUNTER — TRANSFERRED RECORDS (OUTPATIENT)
Dept: HEALTH INFORMATION MANAGEMENT | Facility: CLINIC | Age: 63
End: 2023-09-14
Payer: COMMERCIAL

## 2023-09-14 LAB — RETINOPATHY: NEGATIVE

## 2023-11-08 ENCOUNTER — OFFICE VISIT (OUTPATIENT)
Dept: INTERNAL MEDICINE | Facility: CLINIC | Age: 63
End: 2023-11-08
Payer: COMMERCIAL

## 2023-11-08 VITALS
SYSTOLIC BLOOD PRESSURE: 116 MMHG | WEIGHT: 199.1 LBS | OXYGEN SATURATION: 99 % | TEMPERATURE: 98.1 F | RESPIRATION RATE: 14 BRPM | BODY MASS INDEX: 26.27 KG/M2 | DIASTOLIC BLOOD PRESSURE: 62 MMHG | HEART RATE: 50 BPM

## 2023-11-08 DIAGNOSIS — E55.9 VITAMIN D DEFICIENCY: ICD-10-CM

## 2023-11-08 DIAGNOSIS — E78.00 HYPERCHOLESTEROLEMIA: ICD-10-CM

## 2023-11-08 DIAGNOSIS — I10 ESSENTIAL HYPERTENSION: ICD-10-CM

## 2023-11-08 DIAGNOSIS — Z00.00 ANNUAL PHYSICAL EXAM: Primary | ICD-10-CM

## 2023-11-08 DIAGNOSIS — E11.9 TYPE 2 DIABETES MELLITUS WITHOUT COMPLICATION, WITHOUT LONG-TERM CURRENT USE OF INSULIN (H): ICD-10-CM

## 2023-11-08 DIAGNOSIS — E53.8 VITAMIN B12 DEFICIENCY (NON ANEMIC): ICD-10-CM

## 2023-11-08 DIAGNOSIS — Z12.5 SCREENING FOR PROSTATE CANCER: ICD-10-CM

## 2023-11-08 LAB
ALBUMIN SERPL BCG-MCNC: 4.4 G/DL (ref 3.5–5.2)
ALP SERPL-CCNC: 33 U/L (ref 40–129)
ALT SERPL W P-5'-P-CCNC: 11 U/L (ref 0–70)
ANION GAP SERPL CALCULATED.3IONS-SCNC: 10 MMOL/L (ref 7–15)
AST SERPL W P-5'-P-CCNC: 19 U/L (ref 0–45)
BASOPHILS # BLD AUTO: 0 10E3/UL (ref 0–0.2)
BASOPHILS NFR BLD AUTO: 1 %
BILIRUB SERPL-MCNC: 0.4 MG/DL
BUN SERPL-MCNC: 10.1 MG/DL (ref 8–23)
CALCIUM SERPL-MCNC: 9.7 MG/DL (ref 8.8–10.2)
CHLORIDE SERPL-SCNC: 100 MMOL/L (ref 98–107)
CHOLEST SERPL-MCNC: 146 MG/DL
CREAT SERPL-MCNC: 0.73 MG/DL (ref 0.67–1.17)
CREAT UR-MCNC: 89.7 MG/DL
DEPRECATED HCO3 PLAS-SCNC: 25 MMOL/L (ref 22–29)
EGFRCR SERPLBLD CKD-EPI 2021: >90 ML/MIN/1.73M2
EOSINOPHIL # BLD AUTO: 0.3 10E3/UL (ref 0–0.7)
EOSINOPHIL NFR BLD AUTO: 4 %
ERYTHROCYTE [DISTWIDTH] IN BLOOD BY AUTOMATED COUNT: 13.3 % (ref 10–15)
GLUCOSE SERPL-MCNC: 114 MG/DL (ref 70–99)
HBA1C MFR BLD: 6.9 % (ref 0–5.6)
HCT VFR BLD AUTO: 43.7 % (ref 40–53)
HDLC SERPL-MCNC: 50 MG/DL
HGB BLD-MCNC: 14 G/DL (ref 13.3–17.7)
IMM GRANULOCYTES # BLD: 0 10E3/UL
IMM GRANULOCYTES NFR BLD: 0 %
LDLC SERPL CALC-MCNC: 80 MG/DL
LYMPHOCYTES # BLD AUTO: 2.6 10E3/UL (ref 0.8–5.3)
LYMPHOCYTES NFR BLD AUTO: 37 %
MCH RBC QN AUTO: 27.4 PG (ref 26.5–33)
MCHC RBC AUTO-ENTMCNC: 32 G/DL (ref 31.5–36.5)
MCV RBC AUTO: 86 FL (ref 78–100)
MICROALBUMIN UR-MCNC: <12 MG/L
MICROALBUMIN/CREAT UR: NORMAL MG/G{CREAT}
MONOCYTES # BLD AUTO: 0.6 10E3/UL (ref 0–1.3)
MONOCYTES NFR BLD AUTO: 8 %
NEUTROPHILS # BLD AUTO: 3.6 10E3/UL (ref 1.6–8.3)
NEUTROPHILS NFR BLD AUTO: 51 %
NONHDLC SERPL-MCNC: 96 MG/DL
PLATELET # BLD AUTO: 199 10E3/UL (ref 150–450)
POTASSIUM SERPL-SCNC: 4 MMOL/L (ref 3.4–5.3)
PROT SERPL-MCNC: 7.3 G/DL (ref 6.4–8.3)
PSA SERPL DL<=0.01 NG/ML-MCNC: 1.34 NG/ML (ref 0–4.5)
RBC # BLD AUTO: 5.11 10E6/UL (ref 4.4–5.9)
SODIUM SERPL-SCNC: 135 MMOL/L (ref 135–145)
TRIGL SERPL-MCNC: 82 MG/DL
VIT B12 SERPL-MCNC: 733 PG/ML (ref 232–1245)
VIT D+METAB SERPL-MCNC: 31 NG/ML (ref 20–50)
WBC # BLD AUTO: 7.1 10E3/UL (ref 4–11)

## 2023-11-08 PROCEDURE — 82043 UR ALBUMIN QUANTITATIVE: CPT | Performed by: INTERNAL MEDICINE

## 2023-11-08 PROCEDURE — 82306 VITAMIN D 25 HYDROXY: CPT | Performed by: INTERNAL MEDICINE

## 2023-11-08 PROCEDURE — 99396 PREV VISIT EST AGE 40-64: CPT | Mod: 25 | Performed by: INTERNAL MEDICINE

## 2023-11-08 PROCEDURE — 91320 SARSCV2 VAC 30MCG TRS-SUC IM: CPT | Performed by: INTERNAL MEDICINE

## 2023-11-08 PROCEDURE — 83036 HEMOGLOBIN GLYCOSYLATED A1C: CPT | Performed by: INTERNAL MEDICINE

## 2023-11-08 PROCEDURE — 85025 COMPLETE CBC W/AUTO DIFF WBC: CPT | Performed by: INTERNAL MEDICINE

## 2023-11-08 PROCEDURE — 80053 COMPREHEN METABOLIC PANEL: CPT | Performed by: INTERNAL MEDICINE

## 2023-11-08 PROCEDURE — 82570 ASSAY OF URINE CREATININE: CPT | Performed by: INTERNAL MEDICINE

## 2023-11-08 PROCEDURE — G0103 PSA SCREENING: HCPCS | Performed by: INTERNAL MEDICINE

## 2023-11-08 PROCEDURE — 82607 VITAMIN B-12: CPT | Performed by: INTERNAL MEDICINE

## 2023-11-08 PROCEDURE — 90480 ADMN SARSCOV2 VAC 1/ONLY CMP: CPT | Performed by: INTERNAL MEDICINE

## 2023-11-08 PROCEDURE — 90471 IMMUNIZATION ADMIN: CPT | Performed by: INTERNAL MEDICINE

## 2023-11-08 PROCEDURE — 99214 OFFICE O/P EST MOD 30 MIN: CPT | Mod: 25 | Performed by: INTERNAL MEDICINE

## 2023-11-08 PROCEDURE — 90682 RIV4 VACC RECOMBINANT DNA IM: CPT | Performed by: INTERNAL MEDICINE

## 2023-11-08 PROCEDURE — 36415 COLL VENOUS BLD VENIPUNCTURE: CPT | Performed by: INTERNAL MEDICINE

## 2023-11-08 PROCEDURE — 80061 LIPID PANEL: CPT | Performed by: INTERNAL MEDICINE

## 2023-11-08 RX ORDER — UREA 10 %
500 LOTION (ML) TOPICAL DAILY
Qty: 90 TABLET | Refills: 3 | Status: SHIPPED | OUTPATIENT
Start: 2023-11-08 | End: 2024-09-19

## 2023-11-08 RX ORDER — METFORMIN HCL 500 MG
500 TABLET, EXTENDED RELEASE 24 HR ORAL
Qty: 360 TABLET | Refills: 3 | Status: CANCELLED | OUTPATIENT
Start: 2023-11-08

## 2023-11-08 RX ORDER — LOSARTAN POTASSIUM 50 MG/1
50 TABLET ORAL DAILY
Qty: 90 TABLET | Refills: 3 | Status: SHIPPED | OUTPATIENT
Start: 2023-11-08 | End: 2024-09-19

## 2023-11-08 RX ORDER — ACETAMINOPHEN 160 MG
1 TABLET,DISINTEGRATING ORAL DAILY
Qty: 90 CAPSULE | Refills: 3 | Status: SHIPPED | OUTPATIENT
Start: 2023-11-08 | End: 2024-09-19

## 2023-11-08 ASSESSMENT — PAIN SCALES - GENERAL: PAINLEVEL: NO PAIN (0)

## 2023-11-08 NOTE — LETTER
November 9, 2023      Tano Jansenbijalwai  [address] [place] [address]  [place] [address]        Dear ,    We are writing to inform you of your test results.    Cholesterol level is excellent.  Kidney,liver functions, red cell counts are good.  Vitamin D and B12 levels are optimal, continue supplements.  PSA marker is normal.  A1C is under 7, but has been trending up. Try to stay active and avoid weight gain.    Resulted Orders   Lipid panel reflex to direct LDL Non-fasting   Result Value Ref Range    Cholesterol 146 <200 mg/dL    Triglycerides 82 <150 mg/dL    Direct Measure HDL 50 >=40 mg/dL    LDL Cholesterol Calculated 80 <=100 mg/dL    Non HDL Cholesterol 96 <130 mg/dL    Narrative    Cholesterol  Desirable:  <200 mg/dL    Triglycerides  Normal:  Less than 150 mg/dL  Borderline High:  150-199 mg/dL  High:  200-499 mg/dL  Very High:  Greater than or equal to 500 mg/dL    Direct Measure HDL  Female:  Greater than or equal to 50 mg/dL   Male:  Greater than or equal to 40 mg/dL    LDL Cholesterol  Desirable:  <100mg/dL  Above Desirable:  100-129 mg/dL   Borderline High:  130-159 mg/dL   High:  160-189 mg/dL   Very High:  >= 190 mg/dL    Non HDL Cholesterol  Desirable:  130 mg/dL  Above Desirable:  130-159 mg/dL  Borderline High:  160-189 mg/dL  High:  190-219 mg/dL  Very High:  Greater than or equal to 220 mg/dL   Albumin Random Urine Quantitative with Creat Ratio   Result Value Ref Range    Creatinine Urine mg/dL 89.7 mg/dL      Comment:      The reference ranges have not been established in urine creatinine. The results should be integrated into the clinical context for interpretation.    Albumin Urine mg/L <12.0 mg/L      Comment:      The reference ranges have not been established in urine albumin. The results should be integrated into the clinical context for interpretation.    Albumin Urine mg/g Cr        Comment:      Unable to calculate, urine albumin and/or urine creatinine is outside  detectable limits.  Microalbuminuria is defined as an albumin:creatinine ratio of 17 to 299 for males and 25 to 299 for females. A ratio of albumin:creatinine of 300 or higher is indicative of overt proteinuria.  Due to biologic variability, positive results should be confirmed by a second, first-morning random or 24-hour timed urine specimen. If there is discrepancy, a third specimen is recommended. When 2 out of 3 results are in the microalbuminuria range, this is evidence for incipient nephropathy and warrants increased efforts at glucose control, blood pressure control, and institution of therapy with an angiotensin-converting-enzyme (ACE) inhibitor (if the patient can tolerate it).     Hemoglobin A1c   Result Value Ref Range    Hemoglobin A1C 6.9 (H) 0.0 - 5.6 %      Comment:      Normal <5.7%   Prediabetes 5.7-6.4%    Diabetes 6.5% or higher     Note: Adopted from ADA consensus guidelines.   Comprehensive metabolic panel   Result Value Ref Range    Sodium 135 135 - 145 mmol/L      Comment:      Reference intervals for this test were updated on 09/26/2023 to more accurately reflect our healthy population. There may be differences in the flagging of prior results with similar values performed with this method. Interpretation of those prior results can be made in the context of the updated reference intervals.     Potassium 4.0 3.4 - 5.3 mmol/L    Carbon Dioxide (CO2) 25 22 - 29 mmol/L    Anion Gap 10 7 - 15 mmol/L    Urea Nitrogen 10.1 8.0 - 23.0 mg/dL    Creatinine 0.73 0.67 - 1.17 mg/dL    GFR Estimate >90 >60 mL/min/1.73m2    Calcium 9.7 8.8 - 10.2 mg/dL    Chloride 100 98 - 107 mmol/L    Glucose 114 (H) 70 - 99 mg/dL    Alkaline Phosphatase 33 (L) 40 - 129 U/L    AST 19 0 - 45 U/L      Comment:      Reference intervals for this test were updated on 6/12/2023 to more accurately reflect our healthy population. There may be differences in the flagging of prior results with similar values performed with this  method. Interpretation of those prior results can be made in the context of the updated reference intervals.    ALT 11 0 - 70 U/L      Comment:      Reference intervals for this test were updated on 6/12/2023 to more accurately reflect our healthy population. There may be differences in the flagging of prior results with similar values performed with this method. Interpretation of those prior results can be made in the context of the updated reference intervals.      Protein Total 7.3 6.4 - 8.3 g/dL    Albumin 4.4 3.5 - 5.2 g/dL    Bilirubin Total 0.4 <=1.2 mg/dL   PSA, screen   Result Value Ref Range    Prostate Specific Antigen Screen 1.34 0.00 - 4.50 ng/mL    Narrative    This result is obtained using the Roche Elecsys total PSA method on the guille e801 immunoassay analyzer. Results obtained with different assay methods or kits cannot be used interchangeably.   Vitamin D Deficiency   Result Value Ref Range    Vitamin D, Total (25-Hydroxy) 31 20 - 50 ng/mL      Comment:      optimum levels    Narrative    Season, race, dietary intake, and treatment affect the concentration of 25-hydroxy-Vitamin D. Values may decrease during winter months and increase during summer months.    Vitamin D determination is routinely performed by an immunoassay specific for 25 hydroxyvitamin D3.  If an individual is on vitamin D2(ergocalciferol) supplementation, please specify 25 OH vitamin D2 and D3 level determination by LCMSMS test VITD23.     Vitamin B12   Result Value Ref Range    Vitamin B12 733 232 - 1,245 pg/mL   CBC with platelets and differential   Result Value Ref Range    WBC Count 7.1 4.0 - 11.0 10e3/uL    RBC Count 5.11 4.40 - 5.90 10e6/uL    Hemoglobin 14.0 13.3 - 17.7 g/dL    Hematocrit 43.7 40.0 - 53.0 %    MCV 86 78 - 100 fL    MCH 27.4 26.5 - 33.0 pg    MCHC 32.0 31.5 - 36.5 g/dL    RDW 13.3 10.0 - 15.0 %    Platelet Count 199 150 - 450 10e3/uL    % Neutrophils 51 %    % Lymphocytes 37 %    % Monocytes 8 %    %  Eosinophils 4 %    % Basophils 1 %    % Immature Granulocytes 0 %    Absolute Neutrophils 3.6 1.6 - 8.3 10e3/uL    Absolute Lymphocytes 2.6 0.8 - 5.3 10e3/uL    Absolute Monocytes 0.6 0.0 - 1.3 10e3/uL    Absolute Eosinophils 0.3 0.0 - 0.7 10e3/uL    Absolute Basophils 0.0 0.0 - 0.2 10e3/uL    Absolute Immature Granulocytes 0.0 <=0.4 10e3/uL       If you have any questions or concerns, please call the clinic at the number listed above.       Sincerely,      Yudith Flaherty MD

## 2023-11-08 NOTE — COMMUNITY RESOURCES LIST (ENGLISH)
11/08/2023   [facility] [employer]  N/A  For questions about this resource list or additional care needs, please contact your primary care clinic or care manager.  Phone: [phone]   Email: N/A   Address: [facility] [place] [address]   Hours: N/A        Hotlines and Helplines       Hotline - Housing crisis  1  [facility] (Main Office) - Emergency Services Distance: 5.41 miles      Phone/Virtual   [address] [place] [address]  Language: English  Hours: Mon - Sun Open 24 Hours   Phone: (307) 758-8947 Email: info@[facility].org Website: http://[facility] PharmaceuticalMountainside HospitalBookFresh.org     2  [facility] Distance: 5.93 miles      Phone/Virtual   [address] [place] [address]  Language: English  Hours: Mon - Sun Open 24 Hours   Phone: (515) 838-2141 Email: communications@[facility].org Website: https://[facility]-mn.org/oursaviourshousing/          Housing       Coordinated Entry access point  3  [facility] - Coordinated Access to Housing and Shelter (CAHS) - Coordinated Access Distance: 3.62 miles      In-Person, Phone/Virtual   [address] [place] [address]  Language: English  Hours: Mon - Fri 8:00 AM - 4:30 PM  Fees: Free   Phone: (227) 557-9517 Website: https://www.[facility]./residents/assistance-support/assistance/housing-services-support     4  [facility] Distance: 4.51 miles      In-Person, Phone/Virtual   [address] [place] [address]  Language: English, Chinese  Hours: Mon - Fri 8:30 AM - 4:30 PM  Fees: Free   Phone: (979) 249-2520 Email: info@[place]care.org Website: https://www.[facility].org/locations/[facility]-clinic/     Drop-in center or day shelter  5  [facility] Distance: 6.05 miles      In-Person   [address] [place] [address]  Language: English  Hours: Mon - Fri 9:00 AM - 4:00 PM  Fees: Free   Phone: (992) 292-2873 Email: [facility] Hospit[facility].org  Website: http://listeninghouse.org     6  [facility] Distance: 6.25 miles      In-Person   [address] [place] [address]  Language: English, Ugandan, Amharic  Hours: Mon - Sat 7:00 AM - 3:00 PM  Fees: Free, Self Pay   Phone: (390) 472-1470 Email: info@[employer] Website: https://www.[employer]/locations/opportunity-center/     Housing search assistance  7  [facility] - Online Housing Search Assistance Distance: 1.34 miles      Phone/Virtual   [address] [place] [address]  Language: English  Hours: Mon - Sun Open 24 Hours  Fees: Free   Phone: (461) 698-4224 Email: jacinto@[employer] Website: https://[employer]/     8  [facility] - Mental Health Crisis Housing Search Assistance Distance: 3.73 miles      In-Person, Phone/Virtual   [address] [facility] [address] [place] [address]  Language: English  Hours: Mon - Tue 8:00 AM - 4:30 PM , Wed 8:00 AM - 6:00 PM , Thu - Fri 8:00 AM - 4:30 PM  Fees: Free   Phone: (226) 169-3312 Email: [facility]. Website: https://www.[facility]./residents/health-medical/clinics-services/mental-health/adult-mental-health     Shelter for families  9  [facility] Distance: 19.56 miles      In-Person   [address] [place] [address]  Language: English  Hours: Mon - Fri 3:00 PM - 9:00 AM , Sat - Sun Open 24 Hours  Fees: Free   Phone: (168) 224-9833 Ext.1 Website: https://www.[facility].org/2020/07/03/emergency-family-shelter/     Shelter for individuals  10  [facility] [place] [facility] [place] [facility] Distance: 4.47 miles      In-Person   [address] [place] [address]  Language: English  Hours: Mon - Sun 5:00 PM - 10:00 AM  Fees: Free, Self Pay   Phone: (369) 582-6649 Email: info@[employer].org Website:  https://www.[facility].org/locations/[facility]/     11  [facility] Distance: 5.93 miles      In-Person   [address] [place] [address]  Language: English  Hours: Mon - Sun Open 24 Hours  Fees: Free   Phone: (772) 518-1828 Email: communications@[facility].org Website: https://[facility].org/oursaviourshousing/          Important Numbers & Websites       Emergency Services   [address]  [place] [address]  Poison Control   (456) 997-3517  Suicide Prevention Lifeline   (787) 769-4130 (TALK)  Child Abuse Hotline   (908) 110-7030 (4-A-Child)  Sexual Assault Hotline   (830) 915-4097 (HOPE)  National Runaway Safeline   (706) 941-4213 (RUNAWAY)  All-Options Talkline   (884) 506-5813  Substance Abuse Referral   (811) 901-2280 (HELP)

## 2023-11-08 NOTE — COMMUNITY RESOURCES LIST (ENGLISH)
11/08/2023   [facility] - Outpatient Clinics  N/A  For additional resource needs, please contact your health insurance member services or your primary care team.  Phone: [phone]   Email: N/A   Address: [address] [place] [address]   Hours: N/A        Hotlines and Helplines       Hotline - Housing crisis  1  [facility] (Main Office) - Emergency Services Distance: 5.41 miles      Phone/Virtual   [address] [place] [address]  Language: English  Hours: Mon - Sun Open 24 Hours   Phone: (317) 272-1019 Email: info@[facility].org Website: http://[facility]Exalead.org     2  [facility] Distance: 5.93 miles      Phone/Virtual   [address] [place] [address]  Language: English  Hours: Mon - Sun Open 24 Hours   Phone: (431) 414-8729 Email: communications@[facility]-[place].org Website: https://[facility]-[place].org/oursaviourshousing/          Housing       Coordinated Entry access point  3  [facility] - Coordinated Access to Housing and Shelter (CAHS) - Coordinated Access Distance: 3.62 miles      In-Person, Phone/Virtual   [address] [place] [address]  Language: English  Hours: Mon - Fri 8:00 AM - 4:30 PM  Fees: Free   Phone: (209) 365-4566 Website: https://www.[facility]./residents/assistance-support/assistance/housing-services-support     4  [facility] Distance: 4.51 miles      In-Person, Phone/Virtual   [address] [place] [address]  Language: English, Hungarian  Hours: Mon - Fri 8:30 AM - 4:30 PM  Fees: Free   Phone: (760) 885-2470 Email: info@[facility]care.org Website: https://www.[facility].org/locations/[facility]-clinic/     Drop-in center or day shelter  5  [facility] Distance: 6.05 miles      In-Person   [address] [place] [address]  Language: English  Hours: Mon - Fri 9:00 AM - 4:00 PM  Fees: Free   Phone: (401) 676-2588 Email: sukumar@[facility].org Website:  http://listeninghouse.org     6  [facility] Distance: 6.25 miles      In-Person   [address] [place] [address]  Language: English, Turks and Caicos Islander, Honduran  Hours: Mon - Sat 7:00 AM - 3:00 PM  Fees: Free, Self Pay   Phone: (236) 242-4050 Email: info@[employer] Website: https://www.[employer]/locations/opportunity-center/     Housing search assistance  7  [employer] - https://[employer]/ Distance: 6.86 miles      Phone/Virtual   [address] [place] [address]  Language: English  Hours: Mon - Sun Open 24 Hours   Email: info@[employer] Website: https://[employer]     8  [facility] - Online housing search assistance Distance: 7.81 miles      Phone/Virtual   [address] [address]  Language: English, Hmong, Turks and Ca[place] Islander, Honduran  Hours: Mon - Sun Open 24 Hours   Phone: (314) 361-4181 Email: info@[employer]org Website: http://www.[employer].org/     Shelter for families  9  [facility] Distance: 19.56 miles      In-Person   [address] [place] [address]  Language: English  Hours: Mon - Fri 3:00 PM - 9:00 AM , Sat - Sun Open 24 Hours  Fees: Free   Phone: (409) 191-5016 Ext.1 Website: https://www.[facility].org/2020/07/03/emergency-family-shelter/     Shelter for individuals  10  [facility] [place] [facility] [place] [facility] Distance: 4.47 miles      In-Person   [address] [place] [address]  Language: English  Hours: Mon - Sun 5:00 PM - 10:00 AM  Fees: Free, Self Pay   Phone: (831) 744-6178 Email: info@[employer] Website: https://www.[employer]/locations/[facility]/     11  [facility] Distance: 5.93 miles      In-Person   [address] [place] [address]  Language: English  Hours: Mon - Sun Open 24 Hours  Fees:  Free   Phone: (339) 560-5159 Email: communications@[facility].org Website: https://[facility]-[place].org/oursaviourshousing/          Important Numbers & Websites       [place] [address] 211itedway.org  Poison Control   (590) 358-2709 [place]po[place]  Suicide and Crisis Lifeline   [address] Healthline.org  Childhelp [place] Child Abuse Hotline   211.389.3946 Childhelphotline.org  [place] Sexual Assault Hotline   (539) 283-6685 (HOPE) [facility] Runaway Safeline   (949) 899-3376 (RUNAWAY) Former[facility] named [facility] Cent[facility]unaway.org  Pregnancy & Postpartum Support [place]   Call/text 558-821-4803 Ppsupportmn.org  Substance Abuse National Helpline ([facility]   474-578-HELP (8568) Findtreatment.gov  Emergency Services   911

## 2023-11-08 NOTE — PATIENT INSTRUCTIONS
Flu and covid vaccine today    2. Labs today    3. Da RSV vaccine at the pharmacy.    4. Ask insurance about Shingles vaccine.

## 2023-11-08 NOTE — PROGRESS NOTES
"  Assessment & Plan     Annual physical exam  Flu and COVID shot was administered today.  Discussed to get RSV and shingles vaccine at the pharmacy.  We will get fasting blood work today.  He is up-to-date on colonoscopy.  Discussed increased aerobic exercise    Type 2 diabetes mellitus without complication, without long-term current use of insulin (H)  He is currently back on the 1000 mg of metformin daily.  He is up-to-date on diabetic eye exam, he is on aspirin.  Blood pressure is at goal.  - Albumin Random Urine Quantitative with Creat Ratio; Future  - blood glucose (ONETOUCH ULTRA) test strip; 1 strip by In Vitro route daily  - metFORMIN (GLUCOPHAGE) 1000 MG tablet; Take 1 tablet (1,000 mg) by mouth daily (with dinner)  - Hemoglobin A1c; Future  - Comprehensive metabolic panel; Future    Essential hypertension  Well-controlled on losartan.  - Lipid panel reflex to direct LDL Non-fasting; Future  - losartan (COZAAR) 50 MG tablet; Take 1 tablet (50 mg) by mouth daily  - Comprehensive metabolic panel; Future    Vitamin D deficiency  We will check levels  - Vitamin D Deficiency; Future  - Cholecalciferol (VITAMIN D3) 50 MCG (2000 UT) CAPS; Take 1 capsule by mouth daily    Hypercholesterolemia  He is on Zocor  - Lipid panel reflex to direct LDL Non-fasting; Future  - Comprehensive metabolic panel; Future    Vitamin B12 deficiency (non anemic)  He does take supplement, will check levels  - CBC with platelets and differential; Future  - Vitamin B12; Future  - cyanocobalamin (VITAMIN B-12) 500 MCG tablet; Take 1 tablet (500 mcg) by mouth daily    Screening for prostate cancer  - PSA, screen; Future     BMI:   Estimated body mass index is 26.27 kg/m  as calculated from the following:    Height as of 6/7/23: 1.854 m (6' 1\").    Weight as of this encounter: 90.3 kg (199 lb 1.6 oz).         Yudith Flaherty MD  [facility]   Tano is a 63 year old, presenting for the following " health issues:  Recheck Medication and Diabetes (FOLLOW UP)      11/8/2023     7:10 AM   Additional Questions   Roomed by TRANG VUONG     Tano is a 63 y.o. male with history of type 2 diabetes, high blood pressure, kidney stones, hyperlipidemia and cholecystectomy, pseudoexfoliation of glaucoma, vitamin D deficiency, B12 deficiency.  He is currently here for preventative care.     No concerns or complaints today.  Most recently he decreased his metformin from 1000 mg to 500 mg a day blood sugars increased to 130s-140s and he went back to 1000 mg pill.  It is an immediate release pill and he has not had any problems with his diarrhea or nausea on it.  Weight has been stable.  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  Vision continues to be adequate, he works as a  and is very anxious if his vision gets worse that he my lose his 's license.    He does not smoke or drink alcohol.    He stretches daily but has not been doing any aerobic exercise.    History of Present Illness       Diabetes:   He presents for follow up of diabetes.  He is checking home blood glucose one time daily.   He checks blood glucose before meals.  Blood glucose is never over 200 and never under 70. He is aware of hypoglycemia symptoms including weakness.    He has no concerns regarding his diabetes at this time.   He is not experiencing numbness or burning in feet, excessive thirst, blurry vision, weight changes or redness, sores or blisters on feet. The patient has had a diabetic eye exam in the last 12 months. Eye exam performed on 2023 SPRING. Location of last eye exam [facility].        He eats 2-3 servings of fruits and vegetables daily.He consumes 1 sweetened beverage(s) daily.He exercises with enough effort to increase his heart rate 10 to 19 minutes per day.  He exercises with enough effort to increase his heart rate 4 days per week.   He is taking medications regularly.      Review of Systems   No chest pains palpitations or shortness of breath with activity, no cough or wheezing, no abdominal pain constipation diarrhea or blood in the stool.  No blood in the urine or difficulty emptying his bladder.  She sleeps well and denies any snoring.      Objective    /62 (BP Location: Left arm, Patient Position: Sitting, Cuff Size: Adult Large)   Pulse 50   Temp 98.1  F (36.7  C)   Resp 14   Wt 90.3 kg (199 lb 1.6 oz)   SpO2 99%   BMI 26.27 kg/m    Body mass index is 26.27 kg/m .  Physical Exam   General: well appearing male, alert and oriented x3  EYES: Eyelids, conjunctiva, and sclera were normal. Pupils were normal.   HEAD, EARS, NOSE, MOUTH, AND THROAT: no cervical LAD, no thyromegaly or nodules appreciated. TMs are visualized and normal, oropharynx is clear.  RESPIRATORY: respirations non labored, CTA bl, no wheezes, rales, no forced expiratory wheezing.  CARDIOVASCULAR: Heart rate and rhythm were normal. No murmurs, rubs,gallops. There was no peripheral edema. No carotid bruits.  GASTROINTESTINAL: Positive bowel sounds, abdomen is soft, non tender, non distended.     MUSCULOSKELETAL: Muscle mass was normal for age. No joint synovitis or deformity.  LYMPHATIC: There were no enlarged nodes palpable.  SKIN/HAIR/NAILS: Skin color was normal.  No rashes.  NEUROLOGIC: The patient was alert and oriented.  Speech was normal.  There is no facial asymmetry.   PSYCHIATRIC:  Mood and affect were normal.

## 2023-12-13 ENCOUNTER — TELEPHONE (OUTPATIENT)
Dept: INTERNAL MEDICINE | Facility: CLINIC | Age: 63
End: 2023-12-13
Payer: COMMERCIAL

## 2023-12-13 ENCOUNTER — TELEPHONE (OUTPATIENT)
Dept: INTERNAL MEDICINE | Facility: CLINIC | Age: 63
End: 2023-12-13

## 2023-12-13 NOTE — TELEPHONE ENCOUNTER
December 13, 2023    [facility] Followup Letter was received via fax for Dr. Flaherty to sign.  Patient label was attached to paperwork and placed in provider's inbox to be signed.    Joselin Guardado

## 2023-12-19 NOTE — TELEPHONE ENCOUNTER
December 19, 2023    [facility] Followup Letter was picked up from outbox of Dr. Murray (covering for Dr Flaherty).  Paperwork has been reviewed and is complete.  Per initial initial request, this was placed at the  for pickup.    Joselin Guardado   12/20 Pt came in and picked up letter

## 2024-04-02 NOTE — PROGRESS NOTES
[facility] Clinic Note    Tano Garcia   58 y.o. male    Date of Visit: 1/16/2019  Chief Complaint   Patient presents with     Head Injury     1/3/19; slipped on ice and fell     Neck Pain       ASSESSMENT/PLAN  1. Concussion with loss of consciousness, subsequent encounter  CT Head Without Contrast    CANCELED: CT Head Without Contrast   2. Cervicalgia     3. Acute midline low back pain without sciatica       ---------------------------------------------    Fall on ice, did CT head out of concern for possible SDH (low suspicion, but also he is on ASA).  This was negative, pt verbally notified.  Mild posttraumatic symptoms of whiplash and concussion.      Return in about 2 months (around 3/16/2019), or if symptoms worsen or fail to improve, for Recheck.      SUBJECTIVE    Tano Garcia is here to follow-up on a fall on 1/3, hit the back of the head and there was no LOC.  Unclear where he went, but it sounded like a clinic setting.      Shortly after he felt a bit drowsy, then neck pain and more recently low back pain.  The neck still is hurting.       No fatigue, change in mood, visual changes, balance.  He is mainly having a throbbing discomfort in his head.  He had a friend who fell and hit his head.      He had a friend back home who had delayed presentation of SDH after head trauma.        ROS:   Per HPI, all other systems negative     Medications, allergies, and problem list were reviewed and updated    Patient Active Problem List   Diagnosis     Nephrolithiasis     Osteoarthritis     Type 2 diabetes mellitus (H)     Hyperlipidemia     Prostate cancer screening     No past medical history on file.  Current Outpatient Medications   Medication Sig Dispense Refill     aspirin 81 MG EC tablet Take 1 tablet (81 mg total) by mouth daily. 90 tablet 3     blood glucose meter (GLUCOMETER) Use 1 each As Directed as needed. Dispense glucometer brand per patient's insurance at pharmacy  discretion. (E11.9) 1 each 0     generic lancets Use 1 each As Directed 2 (two) times a day. Dispense brand per patient's insurance at pharmacy discretion. (E11.9) 200 each 1     loratadine (CLARITIN) 10 mg tablet Take 1 tablet (10 mg total) by mouth daily. 30 tablet 3     metFORMIN (GLUCOPHAGE) 1000 MG tablet TAKE 1 TAB BY MOUTH TWICE DAILY. 180 tablet 3     ONETOUCH DELICA LANCETS 30 gauge Misc USE ONCE DAILY 100 each 3     ONETOUCH ULTRA BLUE TEST STRIP strips USE 1 STRIP AS DIRECTED 2 TIMES A  strip 3     simvastatin (ZOCOR) 20 MG tablet Take 1 tablet (20 mg total) by mouth at bedtime. 90 tablet 3     No current facility-administered medications for this visit.      No Known Allergies    EXAM  Vitals:    01/16/19 1556   BP: 122/64   Patient Site: Left Arm   Patient Position: Sitting   Cuff Size: Adult Regular   Pulse: 64   SpO2: 99%   Weight: 205 lb 3.2 oz (93.1 kg)   Height: 6' (1.829 m)       GEN: alert  Neuro: CN II-XII intact, normal gait, normal FNF, no pronator drift, full strength upper/lower ext    Results reviewed:  Ct negative, d/w radiology    Data points: 3    Carlos Pete DO  Internal Medicine  [facility]     How Severe Is Your Skin Lesion?: mild Have Your Skin Lesions Been Treated?: not been treated Is This A New Presentation, Or A Follow-Up?: Skin Lesion

## 2024-05-15 ENCOUNTER — OFFICE VISIT (OUTPATIENT)
Dept: INTERNAL MEDICINE | Facility: CLINIC | Age: 64
End: 2024-05-15
Payer: COMMERCIAL

## 2024-05-15 VITALS
BODY MASS INDEX: 27.43 KG/M2 | HEIGHT: 72 IN | HEART RATE: 54 BPM | DIASTOLIC BLOOD PRESSURE: 60 MMHG | OXYGEN SATURATION: 99 % | WEIGHT: 202.5 LBS | TEMPERATURE: 97.8 F | RESPIRATION RATE: 18 BRPM | SYSTOLIC BLOOD PRESSURE: 130 MMHG

## 2024-05-15 DIAGNOSIS — E11.9 TYPE 2 DIABETES MELLITUS WITHOUT COMPLICATION, WITHOUT LONG-TERM CURRENT USE OF INSULIN (H): Primary | ICD-10-CM

## 2024-05-15 DIAGNOSIS — I10 ESSENTIAL HYPERTENSION: ICD-10-CM

## 2024-05-15 LAB
ANION GAP SERPL CALCULATED.3IONS-SCNC: 9 MMOL/L (ref 7–15)
BUN SERPL-MCNC: 5.5 MG/DL (ref 8–23)
CALCIUM SERPL-MCNC: 9.3 MG/DL (ref 8.8–10.2)
CHLORIDE SERPL-SCNC: 99 MMOL/L (ref 98–107)
CREAT SERPL-MCNC: 0.73 MG/DL (ref 0.67–1.17)
DEPRECATED HCO3 PLAS-SCNC: 26 MMOL/L (ref 22–29)
EGFRCR SERPLBLD CKD-EPI 2021: >90 ML/MIN/1.73M2
GLUCOSE SERPL-MCNC: 119 MG/DL (ref 70–99)
HBA1C MFR BLD: 6.9 % (ref 0–5.6)
POTASSIUM SERPL-SCNC: 4.8 MMOL/L (ref 3.4–5.3)
SODIUM SERPL-SCNC: 134 MMOL/L (ref 135–145)

## 2024-05-15 PROCEDURE — 80048 BASIC METABOLIC PNL TOTAL CA: CPT | Performed by: INTERNAL MEDICINE

## 2024-05-15 PROCEDURE — 36415 COLL VENOUS BLD VENIPUNCTURE: CPT | Performed by: INTERNAL MEDICINE

## 2024-05-15 PROCEDURE — 83036 HEMOGLOBIN GLYCOSYLATED A1C: CPT | Performed by: INTERNAL MEDICINE

## 2024-05-15 PROCEDURE — 99214 OFFICE O/P EST MOD 30 MIN: CPT | Performed by: INTERNAL MEDICINE

## 2024-05-15 ASSESSMENT — PAIN SCALES - GENERAL: PAINLEVEL: NO PAIN (0)

## 2024-05-15 NOTE — PROGRESS NOTES
"  Assessment & Plan     Type 2 diabetes mellitus without complication, without long-term current use of insulin (H)  Will check A1c today, monitor sugars at home are good, he is up-to-date on his eye exam, weight has been stable.  He is on aspirin  - HEMOGLOBIN A1C  - Basic metabolic panel  (Ca, Cl, CO2, Creat, Gluc, K, Na, BUN)    Essential hypertension  Well-controlled on losartan, will check kidney function today.  Discussed increased aerobic activity daily.  - Basic metabolic panel  (Ca, Cl, CO2, Creat, Gluc, K, Na, BUN)    BMI  Estimated body mass index is 27.13 kg/m  as calculated from the following:    Height as of this encounter: 1.84 m (6' 0.44\").    Weight as of this encounter: 91.9 kg (202 lb 8 oz).       Saloni Freedman is a 63 year old, presenting for the following health issues:  Follow Up and Recheck Medication (Pt reports that he is here for general follow up.)      5/15/2024     9:14 AM   Additional Questions   Roomed by ana   Accompanied by alone         5/15/2024     9:14 AM   Patient Reported Additional Medications   Patient reports taking the following new medications none     History of Present Illness       Diabetes:   He presents for follow up of diabetes.  He is checking home blood glucose one time daily.   He checks blood glucose before meals.  Blood glucose is never over 200 and never under 70.  When his blood glucose is low, the patient is asymptomatic for confusion, blurred vision, lethargy and reports not feeling dizzy, shaky, or weak.   He has no concerns regarding his diabetes at this time.   He is not experiencing numbness or burning in feet, excessive thirst, blurry vision, weight changes or redness, sores or blisters on feet.           Hypertension: He presents for follow up of hypertension.  He does not check blood pressure  regularly outside of the clinic. Outpatient blood pressures have not been over 140/90. He follows a low salt diet.     He eats 2-3 servings of fruits " "and vegetables daily.He consumes 1 sweetened beverage(s) daily.He exercises with enough effort to increase his heart rate 10 to 19 minutes per day.  He exercises with enough effort to increase his heart rate 3 or less days per week.   He is taking medications regularly.     Tano is a 63 y.o. male with history of type 2 diabetes, high blood pressure, kidney stones, hyperlipidemia and cholecystectomy, pseudoexfoliation of glaucoma, vitamin D deficiency, B12 deficiency, he drives a city bus.  He is currently here for follow-up.  Blood pressure at home has been greater than 107,     he does check his sugars daily and usually is a between 100-130.  Tano eats 1 meal and day and then snacks on fruits and vegetables in between.  He does have a protein source daily.  His job unfortunately is very sedentary and he does do stretches throughout the day but does not do a lot of walking.    He has distant history of smoking and quit 15 years ago, overall he smoked for 10-15 years, denies any history of asthma or cough.    Review of systems: As above    Objective    /60 (BP Location: Left arm, Patient Position: Sitting, Cuff Size: Adult Regular)   Pulse 54   Temp 97.8  F (36.6  C) (Tympanic)   Resp 18   Ht 1.84 m (6' 0.44\")   Wt 91.9 kg (202 lb 8 oz)   SpO2 99%   BMI 27.13 kg/m    Body mass index is 27.13 kg/m .  Physical Exam   General: well appearing male, alert and oriented x3  EYES: Eyelids, conjunctiva, and sclera were normal. Pupils were normal.   HEAD, EARS, NOSE, MOUTH, AND THROAT: no cervical LAD, no thyromegaly or nodules appreciated. TMs are visualized and normal, oropharynx is clear.  RESPIRATORY: respirations non labored, CTA bl, no wheezes, rales, no forced expiratory wheezing.  CARDIOVASCULAR: Heart rate and rhythm were normal. No murmurs, rubs,gallops. There was no peripheral edema. No carotid bruits.  GASTROINTESTINAL: Positive bowel sounds, abdomen is soft, non tender, non distended.   "   MUSCULOSKELETAL: Muscle mass was normal for age. No joint synovitis or deformity.  LYMPHATIC: There were no enlarged nodes palpable.  SKIN/HAIR/NAILS: Skin color was normal.  No rashes.  NEUROLOGIC: The patient was alert and oriented.  Speech was normal.  There is no facial asymmetry.   PSYCHIATRIC:  Mood and affect were normal.            Signed Electronically by: Yudith Flaherty MD

## 2024-05-15 NOTE — PATIENT INSTRUCTIONS
Try to monitor and increase physical activity , more steps     2. Will check sugar and kidney function today.

## 2024-05-15 NOTE — LETTER
May 16, 2024      Tano Garcia  [address] [place] [address]  [place] [address]      Dear ,    We are writing to inform you of your test results.    Sugar/A1c is at goal.  Kidney function is normal.    Resulted Orders   HEMOGLOBIN A1C   Result Value Ref Range    Hemoglobin A1C 6.9 (H) 0.0 - 5.6 %      Comment:      Normal <5.7%   Prediabetes 5.7-6.4%    Diabetes 6.5% or higher     Note: Adopted from ADA consensus guidelines.   Basic metabolic panel  (Ca, Cl, CO2, Creat, Gluc, K, Na, BUN)   Result Value Ref Range    Sodium 134 (L) 135 - 145 mmol/L      Comment:      Reference intervals for this test were updated on 09/26/2023 to more accurately reflect our healthy population. There may be differences in the flagging of prior results with similar values performed with this method. Interpretation of those prior results can be made in the context of the updated reference intervals.     Potassium 4.8 3.4 - 5.3 mmol/L    Chloride 99 98 - 107 mmol/L    Carbon Dioxide (CO2) 26 22 - 29 mmol/L    Anion Gap 9 7 - 15 mmol/L    Urea Nitrogen 5.5 (L) 8.0 - 23.0 mg/dL    Creatinine 0.73 0.67 - 1.17 mg/dL    GFR Estimate >90 >60 mL/min/1.73m2    Calcium 9.3 8.8 - 10.2 mg/dL    Glucose 119 (H) 70 - 99 mg/dL       If you have any questions or concerns, please call the clinic at the number listed above.       Sincerely,     Yudith Flaherty MD

## 2024-07-02 ENCOUNTER — TRANSFERRED RECORDS (OUTPATIENT)
Dept: HEALTH INFORMATION MANAGEMENT | Facility: CLINIC | Age: 64
End: 2024-07-02
Payer: COMMERCIAL

## 2024-07-02 LAB — RETINOPATHY: NEGATIVE

## 2024-07-09 DIAGNOSIS — E78.00 HYPERCHOLESTEROLEMIA: ICD-10-CM

## 2024-07-09 RX ORDER — SIMVASTATIN 20 MG
TABLET ORAL
Qty: 90 TABLET | Refills: 2 | Status: SHIPPED | OUTPATIENT
Start: 2024-07-09 | End: 2024-09-19

## 2024-09-05 DIAGNOSIS — E11.9 TYPE 2 DIABETES MELLITUS WITHOUT COMPLICATION, WITHOUT LONG-TERM CURRENT USE OF INSULIN (H): ICD-10-CM

## 2024-09-05 RX ORDER — ASPIRIN 81 MG/1
81 TABLET, COATED ORAL DAILY
Qty: 90 TABLET | Refills: 1 | Status: SHIPPED | OUTPATIENT
Start: 2024-09-05

## 2024-09-19 ENCOUNTER — OFFICE VISIT (OUTPATIENT)
Dept: INTERNAL MEDICINE | Facility: CLINIC | Age: 64
End: 2024-09-19
Payer: COMMERCIAL

## 2024-09-19 VITALS
OXYGEN SATURATION: 98 % | HEIGHT: 72 IN | TEMPERATURE: 97.4 F | RESPIRATION RATE: 10 BRPM | WEIGHT: 198 LBS | BODY MASS INDEX: 26.82 KG/M2 | DIASTOLIC BLOOD PRESSURE: 72 MMHG | HEART RATE: 62 BPM | SYSTOLIC BLOOD PRESSURE: 131 MMHG

## 2024-09-19 DIAGNOSIS — E53.8 VITAMIN B12 DEFICIENCY (NON ANEMIC): ICD-10-CM

## 2024-09-19 DIAGNOSIS — E11.9 TYPE 2 DIABETES MELLITUS WITHOUT COMPLICATION, WITHOUT LONG-TERM CURRENT USE OF INSULIN (H): ICD-10-CM

## 2024-09-19 DIAGNOSIS — Z00.00 ANNUAL PHYSICAL EXAM: Primary | ICD-10-CM

## 2024-09-19 DIAGNOSIS — E55.9 VITAMIN D DEFICIENCY: ICD-10-CM

## 2024-09-19 DIAGNOSIS — Z12.5 SCREENING FOR PROSTATE CANCER: ICD-10-CM

## 2024-09-19 DIAGNOSIS — I10 ESSENTIAL HYPERTENSION: ICD-10-CM

## 2024-09-19 DIAGNOSIS — E78.5 HYPERLIPIDEMIA LDL GOAL <100: ICD-10-CM

## 2024-09-19 LAB
ALBUMIN SERPL BCG-MCNC: 4.3 G/DL (ref 3.5–5.2)
ALP SERPL-CCNC: 37 U/L (ref 40–150)
ALT SERPL W P-5'-P-CCNC: 13 U/L (ref 0–70)
ANION GAP SERPL CALCULATED.3IONS-SCNC: 12 MMOL/L (ref 7–15)
AST SERPL W P-5'-P-CCNC: 22 U/L (ref 0–45)
BILIRUB SERPL-MCNC: 0.4 MG/DL
BUN SERPL-MCNC: 10.1 MG/DL (ref 8–23)
CALCIUM SERPL-MCNC: 9.3 MG/DL (ref 8.8–10.4)
CHLORIDE SERPL-SCNC: 103 MMOL/L (ref 98–107)
CHOLEST SERPL-MCNC: 174 MG/DL
CREAT SERPL-MCNC: 0.73 MG/DL (ref 0.67–1.17)
EGFRCR SERPLBLD CKD-EPI 2021: >90 ML/MIN/1.73M2
EST. AVERAGE GLUCOSE BLD GHB EST-MCNC: 154 MG/DL
FASTING STATUS PATIENT QL REPORTED: YES
FASTING STATUS PATIENT QL REPORTED: YES
GLUCOSE SERPL-MCNC: 106 MG/DL (ref 70–99)
HBA1C MFR BLD: 7 % (ref 0–5.6)
HCO3 SERPL-SCNC: 25 MMOL/L (ref 22–29)
HDLC SERPL-MCNC: 48 MG/DL
LDLC SERPL CALC-MCNC: 105 MG/DL
NONHDLC SERPL-MCNC: 126 MG/DL
POTASSIUM SERPL-SCNC: 4.2 MMOL/L (ref 3.4–5.3)
PROT SERPL-MCNC: 7.1 G/DL (ref 6.4–8.3)
PSA SERPL DL<=0.01 NG/ML-MCNC: 1.26 NG/ML (ref 0–4.5)
SODIUM SERPL-SCNC: 140 MMOL/L (ref 135–145)
TRIGL SERPL-MCNC: 104 MG/DL
VIT B12 SERPL-MCNC: 742 PG/ML (ref 232–1245)

## 2024-09-19 PROCEDURE — 99214 OFFICE O/P EST MOD 30 MIN: CPT | Mod: 25 | Performed by: INTERNAL MEDICINE

## 2024-09-19 PROCEDURE — 82607 VITAMIN B-12: CPT | Performed by: INTERNAL MEDICINE

## 2024-09-19 PROCEDURE — 90673 RIV3 VACCINE NO PRESERV IM: CPT | Performed by: INTERNAL MEDICINE

## 2024-09-19 PROCEDURE — 99396 PREV VISIT EST AGE 40-64: CPT | Mod: 25 | Performed by: INTERNAL MEDICINE

## 2024-09-19 PROCEDURE — 36415 COLL VENOUS BLD VENIPUNCTURE: CPT | Performed by: INTERNAL MEDICINE

## 2024-09-19 PROCEDURE — G0103 PSA SCREENING: HCPCS | Performed by: INTERNAL MEDICINE

## 2024-09-19 PROCEDURE — 83036 HEMOGLOBIN GLYCOSYLATED A1C: CPT | Performed by: INTERNAL MEDICINE

## 2024-09-19 PROCEDURE — 90471 IMMUNIZATION ADMIN: CPT | Performed by: INTERNAL MEDICINE

## 2024-09-19 PROCEDURE — 90480 ADMN SARSCOV2 VAC 1/ONLY CMP: CPT | Performed by: INTERNAL MEDICINE

## 2024-09-19 PROCEDURE — 80061 LIPID PANEL: CPT | Performed by: INTERNAL MEDICINE

## 2024-09-19 PROCEDURE — 80053 COMPREHEN METABOLIC PANEL: CPT | Performed by: INTERNAL MEDICINE

## 2024-09-19 PROCEDURE — 91320 SARSCV2 VAC 30MCG TRS-SUC IM: CPT | Performed by: INTERNAL MEDICINE

## 2024-09-19 RX ORDER — LOSARTAN POTASSIUM 50 MG/1
50 TABLET ORAL DAILY
Qty: 90 TABLET | Refills: 3 | Status: SHIPPED | OUTPATIENT
Start: 2024-09-19

## 2024-09-19 RX ORDER — ACETAMINOPHEN 160 MG
1 TABLET,DISINTEGRATING ORAL DAILY
Qty: 90 CAPSULE | Refills: 3 | Status: SHIPPED | OUTPATIENT
Start: 2024-09-19

## 2024-09-19 RX ORDER — UREA 10 %
500 LOTION (ML) TOPICAL DAILY
Qty: 90 TABLET | Refills: 3 | Status: SHIPPED | OUTPATIENT
Start: 2024-09-19

## 2024-09-19 RX ORDER — SIMVASTATIN 20 MG
TABLET ORAL
Qty: 90 TABLET | Refills: 3 | Status: SHIPPED | OUTPATIENT
Start: 2024-09-19

## 2024-09-19 SDOH — HEALTH STABILITY: PHYSICAL HEALTH: ON AVERAGE, HOW MANY MINUTES DO YOU ENGAGE IN EXERCISE AT THIS LEVEL?: 120 MIN

## 2024-09-19 ASSESSMENT — PAIN SCALES - GENERAL: PAINLEVEL: NO PAIN (0)

## 2024-09-19 NOTE — LETTER
September 22, 2024      Tano Garcia  [address] [place]  [place] [address]        Dear ,    We are writing to inform you of your test results.    Tano,  Kidney,liver functions, b12 level are normal.  PSA/prostate cancer screen is normal.  Diabetes is controlled.  LDL cholesterol is slightly elevated, last year it was at goal. Have you been eating more cheese and red meat? If no, we can increase dose of Zocor. If yes, then adjust your diet:)    DR HUMAIRA Souza Orders   Hemoglobin A1c   Result Value Ref Range    Estimated Average Glucose 154 (H) <117 mg/dL    Hemoglobin A1C 7.0 (H) 0.0 - 5.6 %      Comment:      Normal <5.7%   Prediabetes 5.7-6.4%    Diabetes 6.5% or higher     Note: Adopted from ADA consensus guidelines.   Comprehensive metabolic panel   Result Value Ref Range    Sodium 140 135 - 145 mmol/L    Potassium 4.2 3.4 - 5.3 mmol/L    Carbon Dioxide (CO2) 25 22 - 29 mmol/L    Anion Gap 12 7 - 15 mmol/L    Urea Nitrogen 10.1 8.0 - 23.0 mg/dL    Creatinine 0.73 0.67 - 1.17 mg/dL    GFR Estimate >90 >60 mL/min/1.73m2      Comment:      eGFR calculated using 2021 CKD-EPI equation.    Calcium 9.3 8.8 - 10.4 mg/dL      Comment:      Reference intervals for this test were updated on 7/16/2024 to reflect our healthy population more accurately. There may be differences in the flagging of prior results with similar values performed with this method. Those prior results can be interpreted in the context of the updated reference intervals.    Chloride 103 98 - 107 mmol/L    Glucose 106 (H) 70 - 99 mg/dL    Alkaline Phosphatase 37 (L) 40 - 150 U/L    AST 22 0 - 45 U/L    ALT 13 0 - 70 U/L    Protein Total 7.1 6.4 - 8.3 g/dL    Albumin 4.3 3.5 - 5.2 g/dL    Bilirubin Total 0.4 <=1.2 mg/dL    Patient Fasting > 8hrs? Yes    Lipid panel reflex to direct LDL Fasting   Result Value Ref Range    Cholesterol 174 <200 mg/dL    Triglycerides 104 <150 mg/dL    Direct Measure HDL 48 >=40  mg/dL    LDL Cholesterol Calculated 105 (H) <100 mg/dL    Non HDL Cholesterol 126 <130 mg/dL    Patient Fasting > 8hrs? Yes     Narrative    Cholesterol  Desirable: < 200 mg/dL  Borderline High: 200 - 239 mg/dL  High: >= 240 mg/dL    Triglycerides  Normal: < 150 mg/dL  Borderline High: 150 - 199 mg/dL  High: 200-499 mg/dL  Very High: >= 500 mg/dL    Direct Measure HDL  Female: >= 50 mg/dL   Male: >= 40 mg/dL    LDL Cholesterol  Desirable: < 100 mg/dL  Above Desirable: 100 - 129 mg/dL   Borderline High: 130 - 159 mg/dL   High:  160 - 189 mg/dL   Very High: >= 190 mg/dL    Non HDL Cholesterol  Desirable: < 130 mg/dL  Above Desirable: 130 - 159 mg/dL  Borderline High: 160 - 189 mg/dL  High: 190 - 219 mg/dL  Very High: >= 220 mg/dL   PSA, screen   Result Value Ref Range    Prostate Specific Antigen Screen 1.26 0.00 - 4.50 ng/mL    Narrative    This result is obtained using the Roche Elecsys total PSA method on the guille [id number] immunoassay analyzer, which is an ultrasensitive method. Results obtained with different assay methods or kits cannot be used interchangeably.  This test is intended for initial prostate cancer screening. PSA values exceeding the age-specific limits are suspicious for prostate disease, but additional testing, such as prostate biopsy, is needed to diagnose prostate pathology. The American Cancer Society recommends annual examination with digital rectal examination and serum PSA beginning at age 50 and for men with a life expectancy of at least 10 years after detection of prostate cancer. For men in high-risk groups, such as  Americans or men with a first-degree relative diagnosed at a younger age, testing should begin at a younger age. It is generally recommended that information be provided to patients about the benefits and limitations of testing and treatment so they can make informed decisions.   Vitamin B12   Result Value Ref Range    Vitamin B12 742 232 - 1,245 pg/mL       If you have  any questions or concerns, please call the clinic at the number listed above.       Sincerely,      Yudith Flaherty MD

## 2024-09-19 NOTE — NURSING NOTE
Pt tolerated injection (Covid 19 12+ Pfizer Vaccine & Influenza Vaccine 18 to 64yrs: FluBlok). Site was cleansed with alcohol prior to injections. No pain, burning, swelling or redness at the site of the injection. Patient instructed to remain in clinic for 15 minutes afterwards, and to report any adverse reactions

## 2024-09-19 NOTE — PATIENT INSTRUCTIONS
[employer]- documentary on Netflix about longevity and health.    2. Labs today    3. Covid and flu shot today, Get RSV vaccine at the pharmacy.     4. Check with insurance on Shingles vaccine coverage.    5. Goal b/p 105-130.

## 2024-09-19 NOTE — PROGRESS NOTES
"Preventive Care Visit  [facility]  Yudith Flaherty MD, Internal Medicine  Sep 19, 2024      Assessment & Plan     Annual physical exam  Will do fasting labs today, he is up-to-date on colonoscopy, flu and COVID shots were administered today, discussed to get RSV and shingles vaccine at the pharmacy.  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  - PSA, screen; Future    Type 2 diabetes mellitus without complication, without long-term current use of insulin (H)  He continues to be on a small dose of metformin, sugars are well-controlled at home, no hypoglycemia.  He is on aspirin, cholesterol medication and an ARB.  - Hemoglobin A1c; Future  - Comprehensive metabolic panel; Future  - metFORMIN (GLUCOPHAGE) 1000 MG tablet; Take 1 tablet (1,000 mg) by mouth daily (with dinner).    Essential hypertension  Blood pressure is at goal today, however discussed to monitor at home to make sure that his range is less than 130 systolically.  - Comprehensive metabolic panel; Future  - losartan (COZAAR) 50 MG tablet; Take 1 tablet (50 mg) by mouth daily.    Vitamin B12 deficiency (non anemic)  He is taking supplement  - Vitamin B12; Future  - cyanocobalamin (VITAMIN B-12) 500 MCG tablet; Take 1 tablet (500 mcg) by mouth daily.    Hyperlipidemia LDL goal <100  On Zocor  - Comprehensive metabolic panel; Future  - Lipid panel reflex to direct LDL Fasting; Future  - simvastatin (ZOCOR) 20 MG tablet; TAKE 1 TABLET BY MOUTH DAILY    Vitamin D deficiency  On supplement  - Cholecalciferol (VITAMIN D3) 50 MCG (2000 UT) CAPS; Take 1 capsule by mouth daily.    BMI  Estimated body mass index is 26.97 kg/m  as calculated from the following:    Height as of this encounter: 1.825 m (5' 11.85\").    Weight as of this encounter: 89.8 kg (198 lb).       Counseling  Appropriate preventive services were addressed with this patient via screening, questionnaire, or discussion " as appropriate for fall prevention, nutrition, physical activity, Tobacco-use cessation, social engagement, weight loss and cognition.  Checklist reviewing preventive services available has been given to the patient.  Reviewed patient's diet, addressing concerns and/or questions.   He is at risk for lack of exercise and has been provided with information to increase physical activity for the benefit of his well-being.   He is at risk for psychosocial distress and has been provided with information to reduce risk.       Saloni Freedman is a 64 year old, presenting for the following:  Annual Visit (He would like to discuss his blood sugar.  He is a  and needs to submit paperwork. )        9/19/2024    11:02 AM   Additional Questions   Roomed by Larissa CAMARILLO        Health Care Directive  Patient does not have a Health Care Directive or Living Will: Not discussed    LEONOR Freedman is a 64 y.o. male with history of type 2 diabetes, high blood pressure, kidney stones, hyperlipidemia and cholecystectomy, pseudoexfoliation of glaucoma, vitamin D deficiency, B12 deficiency, he drives a city bus.  He is currently here for a physical.    Since I last saw him, he implemented lifestyle changes and currently 3 days a week goes to gym for 2 hours, he does some walking, rowing, push-up and resistant exercises.  He has been doing it for the last 2 months and lost 14 pounds.    During the day he only eats 1 meal a day and snacks on fruits and vegetables in between.    He does see ophthalmologist regularly due to history of diabetes and glaucoma and denies any changes in his vision.    No hearing issues, no falls or balance problems.    Diabetes has been historically well-controlled, at home sugars are between 113-140.  He denies hypoglycemia.  Monofilament test is normal on exam today.        9/19/2024   General Health   How would you rate your overall physical health? Excellent   Feel stress (tense, anxious, or unable to  sleep) Only a little      (!) STRESS CONCERN      9/19/2024   Nutrition   Three or more servings of calcium each day? Yes   Diet: Vegetarian/vegan   How many servings of fruit and vegetables per day? (!) 2-3   How many sweetened beverages each day? 0-1            9/19/2024   Exercise   Days per week of moderate/strenous exercise 3 days   Average minutes spent exercising at this level 120 min            9/19/2024   Social Factors   Frequency of gathering with friends or relatives Once a week   Worry food won't last until get money to buy more No   Food not last or not have enough money for food? No   Do you have housing? (Housing is defined as stable permanent housing and does not include staying ouside in a car, in a tent, in an abandoned building, in an overnight shelter, or couch-surfing.) Yes   Are you worried about losing your housing? No   Lack of transportation? No   Unable to get utilities (heat,electricity)? No            9/19/2024   Fall Risk   Fallen 2 or more times in the past year? No   Trouble with walking or balance? No             9/19/2024   Dental   Dentist two times every year? Yes            9/19/2024   TB Screening   Were you born outside of the [place]? Yes            9/19/2024   Substance Use   Alcohol more than 3/day or more than 7/wk No   Do you use any other substances recreationally? No        Social History     Tobacco Use    Smoking status: Former    Smokeless tobacco: Never   Vaping Use    Vaping status: Never Used   Substance Use Topics    Alcohol use: No           9/19/2024   One time HIV Screening   Previous HIV test? Yes          9/19/2024   STI Screening   New sexual partner(s) since last STI/HIV test? No      Last PSA:   Prostate Specific Antigen Screen   Date Value Ref Range Status   11/08/2023 1.34 0.00 - 4.50 ng/mL Final   10/14/2021 1.03 0.00 - 3.50 ug/L Final     ASCVD Risk   The 10-year ASCVD risk score (Marjorie YANES, et al., 2019) is: 24.6%    Values used to calculate the  "score:      Age: 64 years      Sex: Male      Is Non- : Yes      Diabetic: Yes      Tobacco smoker: No      Systolic Blood Pressure: 131 mmHg      Is BP treated: Yes      HDL Cholesterol: 50 mg/dL      Total Cholesterol: 146 mg/dL      Reviewed and updated as needed this visit by Provider                    Review of systems: As above, no chest pains palpitations shortness of breath or fatigue with physical activity.  She sleeps well, denies depression, no abdominal pain constipation diarrhea or blood in the stool, no urinary symptoms or blood in the urine     Objective    Exam  /72 (BP Location: Left arm, Patient Position: Sitting, Cuff Size: Adult Regular)   Pulse 62   Temp 97.4  F (36.3  C) (Tympanic)   Resp 10   Ht 1.825 m (5' 11.85\")   Wt 89.8 kg (198 lb)   SpO2 98%   BMI 26.97 kg/m     Estimated body mass index is 26.97 kg/m  as calculated from the following:    Height as of this encounter: 1.825 m (5' 11.85\").    Weight as of this encounter: 89.8 kg (198 lb).    Physical Exam  General: well appearing male, alert and oriented x3  EYES: Eyelids, conjunctiva, and sclera were normal. Pupils were normal.   HEAD, EARS, NOSE, MOUTH, AND THROAT: no cervical LAD, no thyromegaly or nodules appreciated. TMs are visualized and normal, oropharynx is clear.  RESPIRATORY: respirations non labored, CTA bl, no wheezes, rales, no forced expiratory wheezing.  CARDIOVASCULAR: Heart rate and rhythm were normal. No murmurs, rubs,gallops. There was no peripheral edema. No carotid bruits.  GASTROINTESTINAL: Positive bowel sounds, abdomen is soft, non tender, non distended.     MUSCULOSKELETAL: Muscle mass was normal for age. No joint synovitis or deformity.  LYMPHATIC: There were no enlarged nodes palpable.  SKIN/HAIR/NAILS: Skin color was normal.  No rashes.  NEUROLOGIC: The patient was alert and oriented.  Speech was normal.  There is no facial asymmetry.   PSYCHIATRIC:  Mood and affect were " normal.   Lower extremity monofilament test is normal        Signed Electronically by: Yudith Flaherty MD

## 2024-10-11 ENCOUNTER — TELEPHONE (OUTPATIENT)
Dept: INTERNAL MEDICINE | Facility: CLINIC | Age: 64
End: 2024-10-11
Payer: COMMERCIAL

## 2024-10-11 NOTE — TELEPHONE ENCOUNTER
October 11, 2024    [employer] Follow Up Letter was received via fax for Dr. Flaherty.  Patient label was attached to paperwork and placed in provider's inbox to be signed.    Rishi Caro

## 2024-10-15 NOTE — TELEPHONE ENCOUNTER
October 15, 2024    [employer] Diabetes (Non-insulin [facility]) followup letter was picked up from outbox of Dr. Flaherty and [facility], per patient request.    Joselin Guardado

## 2024-12-05 DIAGNOSIS — E11.9 TYPE 2 DIABETES MELLITUS WITHOUT COMPLICATION, WITHOUT LONG-TERM CURRENT USE OF INSULIN (H): ICD-10-CM

## 2024-12-05 RX ORDER — BLOOD SUGAR DIAGNOSTIC
STRIP MISCELLANEOUS
Qty: 100 STRIP | Refills: 2 | Status: SHIPPED | OUTPATIENT
Start: 2024-12-05

## 2024-12-31 DIAGNOSIS — E11.9 TYPE 2 DIABETES MELLITUS WITHOUT COMPLICATION, WITHOUT LONG-TERM CURRENT USE OF INSULIN (H): ICD-10-CM

## 2024-12-31 RX ORDER — BLOOD SUGAR DIAGNOSTIC
STRIP MISCELLANEOUS
Qty: 100 STRIP | OUTPATIENT
Start: 2024-12-31

## 2025-02-06 ENCOUNTER — NURSE TRIAGE (OUTPATIENT)
Dept: INTERNAL MEDICINE | Facility: CLINIC | Age: 65
End: 2025-02-06
Payer: COMMERCIAL

## 2025-02-06 NOTE — TELEPHONE ENCOUNTER
Nurse Triage SBAR    Is this a 2nd Level Triage? NO    Situation: Laceration    Background: Patient reports this happened about 30 minutes prior to coming to the clinic.    Assessment: States he was at home using a grinding machine and he cut his hand. Writer looked and viewed an approximately 6 or 7 cm laceration to his right hand extending between the first knuckle of his thumb to the first knuckle of his index finger. The wound is still bleeding despite being wrapped with a cloth that the patient brought with him.    Protocol Recommended Disposition:   Go to ED Now    Recommendation: Advised patient he will likely need stitches and he should present to the ER instead of the clinic. He verbalized understanding and agrees with this plan.          Does the patient meet one of the following criteria for ADS visit consideration? 16+ years old, with an [facility] PCP     TIP  Providers, please consider if this condition is appropriate for management at one of our Acute and Diagnostic Services sites.     If patient is a good candidate, please use dotphrase <dot>triageresponse and select Refer to ADS to document.      Reason for Disposition   Bleeding and won't stop after 10 minutes of direct pressure (using correct technique)    Additional Information   Negative: Animal bite and broken skin   Negative: Human bite and broken skin   Negative: Puncture wound   Negative: Major bleeding (e.g., actively dripping or spurting) and can't be stopped   Negative: Amputation   Negative: Shock suspected (e.g., cold/pale/clammy skin, too weak to stand, low BP, rapid pulse)   Negative: Knife wound (or other possibly deep cut) and to chest, abdomen, back, neck, or head   Negative: Self-injury (e.g., cutting, self-harm) and suicidal or out-of-control   Negative: Sounds like a life-threatening emergency to the triager    Protocols used: Cuts and [id number]

## 2025-04-06 ENCOUNTER — HEALTH MAINTENANCE LETTER (OUTPATIENT)
Age: 65
End: 2025-04-06

## 2025-06-18 ENCOUNTER — OFFICE VISIT (OUTPATIENT)
Dept: INTERNAL MEDICINE | Facility: CLINIC | Age: 65
End: 2025-06-18
Payer: COMMERCIAL

## 2025-06-18 ENCOUNTER — RESULTS FOLLOW-UP (OUTPATIENT)
Dept: INTERNAL MEDICINE | Facility: CLINIC | Age: 65
End: 2025-06-18

## 2025-06-18 VITALS
HEIGHT: 70 IN | BODY MASS INDEX: 29.35 KG/M2 | OXYGEN SATURATION: 98 % | WEIGHT: 205 LBS | SYSTOLIC BLOOD PRESSURE: 144 MMHG | RESPIRATION RATE: 17 BRPM | TEMPERATURE: 97.9 F | DIASTOLIC BLOOD PRESSURE: 79 MMHG | HEART RATE: 60 BPM

## 2025-06-18 DIAGNOSIS — I10 ESSENTIAL HYPERTENSION: ICD-10-CM

## 2025-06-18 DIAGNOSIS — E78.5 HYPERLIPIDEMIA LDL GOAL <100: ICD-10-CM

## 2025-06-18 DIAGNOSIS — E11.9 TYPE 2 DIABETES MELLITUS WITHOUT COMPLICATION, WITHOUT LONG-TERM CURRENT USE OF INSULIN (H): Primary | ICD-10-CM

## 2025-06-18 LAB
ANION GAP SERPL CALCULATED.3IONS-SCNC: 10 MMOL/L (ref 7–15)
BUN SERPL-MCNC: 10.4 MG/DL (ref 8–23)
CALCIUM SERPL-MCNC: 9.2 MG/DL (ref 8.8–10.4)
CHLORIDE SERPL-SCNC: 106 MMOL/L (ref 98–107)
CREAT SERPL-MCNC: 0.81 MG/DL (ref 0.67–1.17)
CREAT UR-MCNC: 141 MG/DL
EGFRCR SERPLBLD CKD-EPI 2021: >90 ML/MIN/1.73M2
EST. AVERAGE GLUCOSE BLD GHB EST-MCNC: 143 MG/DL
GLUCOSE SERPL-MCNC: 110 MG/DL (ref 70–99)
HBA1C MFR BLD: 6.6 % (ref 0–5.6)
HCO3 SERPL-SCNC: 25 MMOL/L (ref 22–29)
MICROALBUMIN UR-MCNC: <12 MG/L
MICROALBUMIN/CREAT UR: NORMAL MG/G{CREAT}
POTASSIUM SERPL-SCNC: 4.3 MMOL/L (ref 3.4–5.3)
SODIUM SERPL-SCNC: 141 MMOL/L (ref 135–145)
VIT B12 SERPL-MCNC: 548 PG/ML (ref 232–1245)

## 2025-06-18 PROCEDURE — 1126F AMNT PAIN NOTED NONE PRSNT: CPT | Performed by: INTERNAL MEDICINE

## 2025-06-18 PROCEDURE — 99214 OFFICE O/P EST MOD 30 MIN: CPT | Performed by: INTERNAL MEDICINE

## 2025-06-18 PROCEDURE — 82043 UR ALBUMIN QUANTITATIVE: CPT | Performed by: INTERNAL MEDICINE

## 2025-06-18 PROCEDURE — 83036 HEMOGLOBIN GLYCOSYLATED A1C: CPT | Performed by: INTERNAL MEDICINE

## 2025-06-18 PROCEDURE — 36415 COLL VENOUS BLD VENIPUNCTURE: CPT | Performed by: INTERNAL MEDICINE

## 2025-06-18 PROCEDURE — 82607 VITAMIN B-12: CPT | Performed by: INTERNAL MEDICINE

## 2025-06-18 PROCEDURE — 3078F DIAST BP <80 MM HG: CPT | Performed by: INTERNAL MEDICINE

## 2025-06-18 PROCEDURE — 3044F HG A1C LEVEL LT 7.0%: CPT | Performed by: INTERNAL MEDICINE

## 2025-06-18 PROCEDURE — 80048 BASIC METABOLIC PNL TOTAL CA: CPT | Performed by: INTERNAL MEDICINE

## 2025-06-18 PROCEDURE — G2211 COMPLEX E/M VISIT ADD ON: HCPCS | Performed by: INTERNAL MEDICINE

## 2025-06-18 PROCEDURE — 82570 ASSAY OF URINE CREATININE: CPT | Performed by: INTERNAL MEDICINE

## 2025-06-18 PROCEDURE — 3077F SYST BP >= 140 MM HG: CPT | Performed by: INTERNAL MEDICINE

## 2025-06-18 RX ORDER — LOSARTAN POTASSIUM 50 MG/1
50 TABLET ORAL DAILY
Qty: 90 TABLET | Refills: 3 | Status: SHIPPED | OUTPATIENT
Start: 2025-06-18

## 2025-06-18 RX ORDER — SIMVASTATIN 20 MG
TABLET ORAL
Qty: 90 TABLET | Refills: 3 | Status: SHIPPED | OUTPATIENT
Start: 2025-06-18

## 2025-06-18 ASSESSMENT — PAIN SCALES - GENERAL: PAINLEVEL_OUTOF10: NO PAIN (0)

## 2025-06-18 NOTE — PATIENT INSTRUCTIONS
Kambucha tea can be tried ( carbonated)    2. Take B complex    3. If A1C is above goal, we can add Jardiance to Metformin.    4. Check b/p at home for 10 days and send me records, goal b/p <130/80.    5. Decrease sweet fruits , try butter nut squash, berries,     6. Try to get 2,000 steps or more a day ( at least 5 days a week). Do not walk outside if air quality is not good ( over 50) or if it is too hot.

## 2025-06-18 NOTE — PROGRESS NOTES
Assessment & Plan     Type 2 diabetes mellitus without complication, without long-term current use of insulin (H)  Will check A1c today, he has gained 7 pounds and we discussed his diet, would like him to decrease amount of high glycemic foods he is eating and substitute with lower glycemic fruits and vegetables.  He is seeing ophthalmology regularly and will be seeing them in August, no changes in his vision.  Discussed if A1c is above goal we could always add Jardiance.  He had B12 deficiency in the past, currently off base 12 supplement for 12 months, will check level, discussed to take B complex supplement daily to prevent deficiency in the future.  - Albumin Random Urine Quantitative with Creat Ratio; Future  - blood glucose (CONTOUR NEXT TEST) test strip; 1 strip by In Vitro route as needed.  - metFORMIN (GLUCOPHAGE) 1000 MG tablet; Take 1 tablet (1,000 mg) by mouth daily (with dinner).  - Albumin Random Urine Quantitative with Creat Ratio  - HEMOGLOBIN A1C  - Vitamin B12  - Basic metabolic panel  (Ca, Cl, CO2, Creat, Gluc, K, Na, BUN)    Hyperlipidemia LDL goal <100  Previous LDL was at goal  - simvastatin (ZOCOR) 20 MG tablet; TAKE 1 TABLET BY MOUTH DAILY    Essential hypertension  Blood pressure today is above goal, most likely due to 2 beers he drank yesterday.  He will monitor it at home and after 10 days send me a message with his numbers, we can always increase his losartan dose if needed.  - losartan (COZAAR) 50 MG tablet; Take 1 tablet (50 mg) by mouth daily.  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    The longitudinal plan of care for the diagnosis(es)/condition(s) as documented were addressed during this visit. Due to the added complexity in care, I will continue to support Tano in the subsequent management and with ongoing continuity of care.     BMI  Estimated  "body mass index is 29.41 kg/m  as calculated from the following:    Height as of this encounter: 1.778 m (5' 10\").    Weight as of this encounter: 93 kg (205 lb).       Saloni Freedamn is a 64 year old, presenting for the following health issues:  Follow Up      6/18/2025     7:30 AM   Additional Questions   Roomed by taj castro     History of Present Illness       Reason for visit:  Follow up   He is taking medications regularly.      Tano is a 64 y.o. male with history of type 2 diabetes, high blood pressure, kidney stones, hyperlipidemia and cholecystectomy, pseudoexfoliation of glaucoma, vitamin D deficiency, B12 deficiency, he drives a city bus.  Here for a follow-up.    He has been slightly less physically active and did gain 7 pounds.  We discussed his diet today, he only eats 1 meal a day and the rest of the time snacks on foods which sometimes is sweeter like sweet potato, orange and teresa.    He has been off B12 supplement for a year, in the past he did have a low level.  He is on metformin.  Discussed to take B complex supplement long-term.    Sugars fluctuated but a lot of times they are at goal, he denies any low sugars, hypoglycemia, passing out, dizziness or seizures.    He does drink beer on the weekends, yesterday he had 3 beers and blood pressure today is slightly borderline.    Review of systems: As above, no chest pains palpitations or shortness of breath, no daytime somnolence, no snoring when he sleeps      Objective    BP (!) 144/79 (BP Location: Left arm, Patient Position: Sitting, Cuff Size: Adult Large)   Pulse 60   Temp 97.9  F (36.6  C)   Resp 17   Ht 1.778 m (5' 10\")   Wt 93 kg (205 lb)   SpO2 98%   BMI 29.41 kg/m    Body mass index is 29.41 kg/m .  Physical Exam   General: well appearing male, alert and oriented x3  EYES: Eyelids, conjunctiva, and sclera were normal. Pupils were normal.   HEAD, EARS, NOSE, MOUTH, AND THROAT: no cervical LAD, no thyromegaly or nodules " appreciated. TMs are visualized and normal, oropharynx is clear, slightly crowded.  RESPIRATORY: respirations non labored, CTA bl, no wheezes, rales, no forced expiratory wheezing.  CARDIOVASCULAR: Heart rate and rhythm were normal. No murmurs, rubs,gallops. There was no peripheral edema. No carotid bruits.  GASTROINTESTINAL: Positive bowel sounds, abdomen is soft, non tender, non distended.     MUSCULOSKELETAL: Muscle mass was normal for age. No joint synovitis or deformity.  LYMPHATIC: There were no enlarged nodes palpable.  SKIN/HAIR/NAILS: Skin color was normal.  No rashes.  NEUROLOGIC: The patient was alert and oriented.  Speech was normal.  There is no facial asymmetry.   PSYCHIATRIC:  Mood and affect were normal.   Extremities: Good DP pulses, no skin ulcerations.        Signed Electronically by: Yudith Flaherty MD

## 2025-06-24 ENCOUNTER — ALLIED HEALTH/NURSE VISIT (OUTPATIENT)
Dept: FAMILY MEDICINE | Facility: CLINIC | Age: 65
End: 2025-06-24
Payer: COMMERCIAL

## 2025-06-24 VITALS — DIASTOLIC BLOOD PRESSURE: 74 MMHG | SYSTOLIC BLOOD PRESSURE: 128 MMHG

## 2025-06-24 DIAGNOSIS — I10 ESSENTIAL HYPERTENSION: Primary | ICD-10-CM

## 2025-06-24 PROCEDURE — 3074F SYST BP LT 130 MM HG: CPT

## 2025-06-24 PROCEDURE — 99207 PR NO CHARGE NURSE ONLY: CPT

## 2025-06-24 PROCEDURE — 3078F DIAST BP <80 MM HG: CPT

## 2025-06-24 NOTE — PROGRESS NOTES
I met with Tano Garcia at the request of Dr. Flaherty to recheck his blood pressure.  Blood pressure medications on the med list were reviewed with patient.    Patient has taken all medications as per usual regimen: Yes  Patient reports tolerating them without any issues or concerns: Yes    Vitals:    06/24/25 1408   BP: 128/74   BP Location: Left arm   Patient Position: Sitting   Cuff Size: Adult Regular       Blood pressure was taken, previous encounter was reviewed, recorded blood pressure below 140/90.  Patient was discharged and the note will be sent to the provider for final review.

## 2025-07-09 ENCOUNTER — TELEPHONE (OUTPATIENT)
Dept: INTERNAL MEDICINE | Facility: CLINIC | Age: 65
End: 2025-07-09

## 2025-07-09 ENCOUNTER — ALLIED HEALTH/NURSE VISIT (OUTPATIENT)
Dept: FAMILY MEDICINE | Facility: CLINIC | Age: 65
End: 2025-07-09
Payer: COMMERCIAL

## 2025-07-09 ENCOUNTER — MYC REFILL (OUTPATIENT)
Dept: INTERNAL MEDICINE | Facility: CLINIC | Age: 65
End: 2025-07-09

## 2025-07-09 VITALS
OXYGEN SATURATION: 99 % | DIASTOLIC BLOOD PRESSURE: 64 MMHG | SYSTOLIC BLOOD PRESSURE: 136 MMHG | RESPIRATION RATE: 11 BRPM

## 2025-07-09 DIAGNOSIS — I10 ESSENTIAL HYPERTENSION: ICD-10-CM

## 2025-07-09 DIAGNOSIS — I10 ESSENTIAL HYPERTENSION: Primary | ICD-10-CM

## 2025-07-09 PROCEDURE — 99207 PR NO CHARGE NURSE ONLY: CPT

## 2025-07-09 PROCEDURE — 3078F DIAST BP <80 MM HG: CPT

## 2025-07-09 PROCEDURE — 3075F SYST BP GE 130 - 139MM HG: CPT

## 2025-07-09 PROCEDURE — 1126F AMNT PAIN NOTED NONE PRSNT: CPT

## 2025-07-09 RX ORDER — MULTIVITAMIN WITH IRON
1 TABLET ORAL DAILY
COMMUNITY

## 2025-07-09 RX ORDER — LOSARTAN POTASSIUM 50 MG/1
75 TABLET ORAL DAILY
Qty: 135 TABLET | Refills: 3 | Status: SHIPPED | OUTPATIENT
Start: 2025-07-09

## 2025-07-09 ASSESSMENT — PAIN SCALES - GENERAL: PAINLEVEL_OUTOF10: NO PAIN (0)

## 2025-07-09 NOTE — PROGRESS NOTES
I met with Tano Garcia at the request of Dr. Flaherty to recheck his blood pressure.  Blood pressure medications on the med list were reviewed with patient.    Patient has taken all medications as per usual regimen: Yes  Patient reports tolerating them without any issues or concerns: Yes  Patient denies headaches, vision changes, and/or nausea/vomiting     Vitals:    07/09/25 1049   BP: 136/64   BP Location: Left arm   Patient Position: Sitting   Cuff Size: Adult Regular   Resp: 11   SpO2: 99%        BP Readings from Last 6 Encounters:   07/09/25 136/64   06/24/25 128/74   06/18/25 (!) 144/79   09/19/24 131/72   05/15/24 130/60   11/08/23 116/62      Pt reports purchasing a BP machine on [employer] yesterday (7/8/25): [employer] BP Machine. Pt was given blood pressure log from the [facility] to record Bps for next MD appt in December. Pt to call clinic to make appt when he has his winter work schedule     Blood pressure was taken, previous encounter was reviewed, recorded blood pressure below 140/90.  Patient was discharged and the note will be sent to the provider for final review.

## 2025-07-09 NOTE — TELEPHONE ENCOUNTER
Spoke to patient and relayed providers advisement. Patient verbalized understanding. Will be picking up BP monitor on 7/11.

## 2025-07-09 NOTE — TELEPHONE ENCOUNTER
Please let patient know, blood pressure readings are still slightly borderline, I would like them consistently to be less than 130 systolically.  I would recommend increasing losartan from 50 mg to 75 mg (take 1-1/2 tablet daily).  And monitoring at home.  I sent new prescription in.

## 2025-07-10 RX ORDER — LOSARTAN POTASSIUM 50 MG/1
75 TABLET ORAL DAILY
Qty: 135 TABLET | Refills: 3 | OUTPATIENT
Start: 2025-07-10

## 2025-07-17 ENCOUNTER — ALLIED HEALTH/NURSE VISIT (OUTPATIENT)
Dept: FAMILY MEDICINE | Facility: CLINIC | Age: 65
End: 2025-07-17
Payer: COMMERCIAL

## 2025-07-17 VITALS — DIASTOLIC BLOOD PRESSURE: 65 MMHG | SYSTOLIC BLOOD PRESSURE: 136 MMHG

## 2025-07-17 DIAGNOSIS — Z01.30 BLOOD PRESSURE CHECK: Primary | ICD-10-CM

## 2025-07-17 NOTE — PROGRESS NOTES
Tano Garcia is a 64 year old year old patient who comes in today for a Blood Pressure check because of new medication and ongoing blood pressure monitoring.  Vital Signs as repeated by MICHAELA Pinto  Patient is taking medication as prescribed  Patient is tolerating medications well.  Patient is monitoring Blood Pressure at home.  Average readings if yes are 125/56  Current complaints: none  Disposition:  patient to continue with the same medication and patient reminded to call as needed     At home machine brought in- BP taken reflected: 136/65  Educated patient on how to properly place arm band on himself. Writer educated the importance of correct placement to ensure accurate readings at home. Patient expressed understanding and was able to properly demonstrate correct self placement of the cuff. No further questions at the conclusion of the visit.

## 2025-07-31 ENCOUNTER — TRANSFERRED RECORDS (OUTPATIENT)
Dept: HEALTH INFORMATION MANAGEMENT | Facility: CLINIC | Age: 65
End: 2025-07-31
Payer: COMMERCIAL

## 2025-07-31 LAB — RETINOPATHY: NEGATIVE

## 2025-08-20 ENCOUNTER — PATIENT OUTREACH (OUTPATIENT)
Dept: CARE COORDINATION | Facility: CLINIC | Age: 65
End: 2025-08-20
Payer: COMMERCIAL

## 2025-09-03 ENCOUNTER — PATIENT OUTREACH (OUTPATIENT)
Dept: CARE COORDINATION | Facility: CLINIC | Age: 65
End: 2025-09-03
Payer: COMMERCIAL